# Patient Record
Sex: FEMALE | Race: WHITE | Employment: PART TIME | URBAN - METROPOLITAN AREA
[De-identification: names, ages, dates, MRNs, and addresses within clinical notes are randomized per-mention and may not be internally consistent; named-entity substitution may affect disease eponyms.]

---

## 2017-11-30 PROBLEM — I10 ESSENTIAL HYPERTENSION: Status: ACTIVE | Noted: 2017-11-30

## 2018-03-03 ENCOUNTER — PATIENT MESSAGE (OUTPATIENT)
Dept: FAMILY MEDICINE CLINIC | Age: 53
End: 2018-03-03

## 2018-03-03 DIAGNOSIS — I10 ESSENTIAL HYPERTENSION: ICD-10-CM

## 2018-03-05 NOTE — TELEPHONE ENCOUNTER
From: Claybon Runner  To: Glenna Smith MD  Sent: 3/3/2018 3:30 PM EST  Subject: Prescription Elnora Gist . We were trying hydrochlorothiazide 12.5 with the Losartan. It seems to be working well. My numbers have been 130-140s under mid 70s to low 80s. I am due for a refill. I only have 6 left. Can you refill this for me with refills. My next appt with you is in may. Thank you Javid Mckenna.

## 2018-03-06 RX ORDER — HYDROCHLOROTHIAZIDE 12.5 MG/1
12.5 TABLET ORAL DAILY
Qty: 90 TABLET | Refills: 1 | Status: SHIPPED | OUTPATIENT
Start: 2018-03-06 | End: 2018-07-29 | Stop reason: SDUPTHER

## 2018-05-31 ENCOUNTER — HOSPITAL ENCOUNTER (OUTPATIENT)
Age: 53
Discharge: HOME | End: 2018-06-02
Payer: COMMERCIAL

## 2018-05-31 ENCOUNTER — OFFICE VISIT (OUTPATIENT)
Dept: FAMILY MEDICINE CLINIC | Age: 53
End: 2018-05-31
Payer: COMMERCIAL

## 2018-05-31 VITALS
SYSTOLIC BLOOD PRESSURE: 118 MMHG | WEIGHT: 219 LBS | TEMPERATURE: 98.5 F | DIASTOLIC BLOOD PRESSURE: 70 MMHG | OXYGEN SATURATION: 98 % | BODY MASS INDEX: 36.44 KG/M2 | HEART RATE: 92 BPM

## 2018-05-31 DIAGNOSIS — E78.2 MIXED HYPERLIPIDEMIA: ICD-10-CM

## 2018-05-31 DIAGNOSIS — I10 ESSENTIAL HYPERTENSION: Primary | ICD-10-CM

## 2018-05-31 DIAGNOSIS — I10 ESSENTIAL HYPERTENSION: ICD-10-CM

## 2018-05-31 DIAGNOSIS — M79.2 RADICULAR PAIN IN RIGHT ARM: ICD-10-CM

## 2018-05-31 LAB
ALBUMIN SERPL-MCNC: 4.2 G/DL (ref 3.5–5.2)
ALP BLD-CCNC: 54 U/L (ref 35–104)
ALT SERPL-CCNC: 29 U/L (ref 0–32)
ANION GAP SERPL CALCULATED.3IONS-SCNC: 12 MMOL/L (ref 7–16)
AST SERPL-CCNC: 29 U/L (ref 0–31)
BASOPHILS ABSOLUTE: 0.04 E9/L (ref 0–0.2)
BASOPHILS RELATIVE PERCENT: 0.6 % (ref 0–2)
BILIRUB SERPL-MCNC: 0.8 MG/DL (ref 0–1.2)
BUN BLDV-MCNC: 18 MG/DL (ref 6–20)
CALCIUM SERPL-MCNC: 9.9 MG/DL (ref 8.6–10.2)
CHLORIDE BLD-SCNC: 100 MMOL/L (ref 98–107)
CHOLESTEROL, TOTAL: 183 MG/DL (ref 0–199)
CO2: 27 MMOL/L (ref 22–29)
CREAT SERPL-MCNC: 0.7 MG/DL (ref 0.5–1)
EOSINOPHILS ABSOLUTE: 0.19 E9/L (ref 0.05–0.5)
EOSINOPHILS RELATIVE PERCENT: 3.1 % (ref 0–6)
GFR AFRICAN AMERICAN: >60
GFR NON-AFRICAN AMERICAN: >60 ML/MIN/1.73
GLUCOSE BLD-MCNC: 79 MG/DL (ref 74–109)
HCT VFR BLD CALC: 41.4 % (ref 34–48)
HDLC SERPL-MCNC: 45 MG/DL
HEMOGLOBIN: 13.4 G/DL (ref 11.5–15.5)
IMMATURE GRANULOCYTES #: 0.02 E9/L
IMMATURE GRANULOCYTES %: 0.3 % (ref 0–5)
LDL CHOLESTEROL CALCULATED: 103 MG/DL (ref 0–99)
LYMPHOCYTES ABSOLUTE: 1.51 E9/L (ref 1.5–4)
LYMPHOCYTES RELATIVE PERCENT: 24.5 % (ref 20–42)
MCH RBC QN AUTO: 29.8 PG (ref 26–35)
MCHC RBC AUTO-ENTMCNC: 32.4 % (ref 32–34.5)
MCV RBC AUTO: 92 FL (ref 80–99.9)
MONOCYTES ABSOLUTE: 0.56 E9/L (ref 0.1–0.95)
MONOCYTES RELATIVE PERCENT: 9.1 % (ref 2–12)
NEUTROPHILS ABSOLUTE: 3.85 E9/L (ref 1.8–7.3)
NEUTROPHILS RELATIVE PERCENT: 62.4 % (ref 43–80)
PDW BLD-RTO: 13.4 FL (ref 11.5–15)
PLATELET # BLD: 220 E9/L (ref 130–450)
PMV BLD AUTO: 10.7 FL (ref 7–12)
POTASSIUM SERPL-SCNC: 4.2 MMOL/L (ref 3.5–5)
RBC # BLD: 4.5 E12/L (ref 3.5–5.5)
SODIUM BLD-SCNC: 139 MMOL/L (ref 132–146)
TOTAL PROTEIN: 7.3 G/DL (ref 6.4–8.3)
TRIGL SERPL-MCNC: 174 MG/DL (ref 0–149)
VLDLC SERPL CALC-MCNC: 35 MG/DL
WBC # BLD: 6.2 E9/L (ref 4.5–11.5)

## 2018-05-31 PROCEDURE — 3014F SCREEN MAMMO DOC REV: CPT | Performed by: FAMILY MEDICINE

## 2018-05-31 PROCEDURE — 80053 COMPREHEN METABOLIC PANEL: CPT

## 2018-05-31 PROCEDURE — 1036F TOBACCO NON-USER: CPT | Performed by: FAMILY MEDICINE

## 2018-05-31 PROCEDURE — 85025 COMPLETE CBC W/AUTO DIFF WBC: CPT

## 2018-05-31 PROCEDURE — 99214 OFFICE O/P EST MOD 30 MIN: CPT | Performed by: FAMILY MEDICINE

## 2018-05-31 PROCEDURE — 3017F COLORECTAL CA SCREEN DOC REV: CPT | Performed by: FAMILY MEDICINE

## 2018-05-31 PROCEDURE — G8417 CALC BMI ABV UP PARAM F/U: HCPCS | Performed by: FAMILY MEDICINE

## 2018-05-31 PROCEDURE — 36415 COLL VENOUS BLD VENIPUNCTURE: CPT | Performed by: FAMILY MEDICINE

## 2018-05-31 PROCEDURE — G8427 DOCREV CUR MEDS BY ELIG CLIN: HCPCS | Performed by: FAMILY MEDICINE

## 2018-05-31 PROCEDURE — 80061 LIPID PANEL: CPT

## 2018-05-31 RX ORDER — METHYLPREDNISOLONE 4 MG/1
TABLET ORAL
Qty: 1 KIT | Refills: 0 | Status: SHIPPED | OUTPATIENT
Start: 2018-05-31 | End: 2018-06-06

## 2018-05-31 RX ORDER — FLUTICASONE PROPIONATE 50 MCG
1 SPRAY, SUSPENSION (ML) NASAL DAILY
COMMUNITY
End: 2019-03-19 | Stop reason: ALTCHOICE

## 2018-06-01 ENCOUNTER — PATIENT MESSAGE (OUTPATIENT)
Dept: FAMILY MEDICINE CLINIC | Age: 53
End: 2018-06-01

## 2018-06-01 DIAGNOSIS — M79.2 RADICULAR PAIN IN RIGHT ARM: Primary | ICD-10-CM

## 2018-06-08 DIAGNOSIS — M79.2 RADICULAR PAIN IN RIGHT ARM: ICD-10-CM

## 2018-07-29 DIAGNOSIS — I10 ESSENTIAL HYPERTENSION: ICD-10-CM

## 2018-07-30 RX ORDER — HYDROCHLOROTHIAZIDE 12.5 MG/1
12.5 TABLET ORAL DAILY
Qty: 90 TABLET | Refills: 1 | Status: SHIPPED | OUTPATIENT
Start: 2018-07-30 | End: 2018-11-29 | Stop reason: SDUPTHER

## 2018-07-30 RX ORDER — LOSARTAN POTASSIUM AND HYDROCHLOROTHIAZIDE 25; 100 MG/1; MG/1
TABLET ORAL
Qty: 90 TABLET | Refills: 1 | Status: SHIPPED | OUTPATIENT
Start: 2018-07-30 | End: 2018-11-29 | Stop reason: SDUPTHER

## 2018-10-21 DIAGNOSIS — E78.5 HYPERLIPIDEMIA, UNSPECIFIED HYPERLIPIDEMIA TYPE: ICD-10-CM

## 2018-10-22 RX ORDER — ATORVASTATIN CALCIUM 20 MG/1
20 TABLET, FILM COATED ORAL DAILY
Qty: 90 TABLET | Refills: 1 | Status: SHIPPED | OUTPATIENT
Start: 2018-10-22 | End: 2018-11-29 | Stop reason: SDUPTHER

## 2018-11-29 ENCOUNTER — OFFICE VISIT (OUTPATIENT)
Dept: FAMILY MEDICINE CLINIC | Age: 53
End: 2018-11-29
Payer: COMMERCIAL

## 2018-11-29 VITALS
HEART RATE: 78 BPM | TEMPERATURE: 98 F | SYSTOLIC BLOOD PRESSURE: 128 MMHG | OXYGEN SATURATION: 94 % | WEIGHT: 215 LBS | DIASTOLIC BLOOD PRESSURE: 80 MMHG | BODY MASS INDEX: 35.78 KG/M2

## 2018-11-29 DIAGNOSIS — L98.9 SKIN LESION: ICD-10-CM

## 2018-11-29 DIAGNOSIS — M79.644 PAIN OF RIGHT THUMB: ICD-10-CM

## 2018-11-29 DIAGNOSIS — I10 ESSENTIAL HYPERTENSION: Primary | ICD-10-CM

## 2018-11-29 PROCEDURE — G8427 DOCREV CUR MEDS BY ELIG CLIN: HCPCS | Performed by: FAMILY MEDICINE

## 2018-11-29 PROCEDURE — 3017F COLORECTAL CA SCREEN DOC REV: CPT | Performed by: FAMILY MEDICINE

## 2018-11-29 PROCEDURE — 1036F TOBACCO NON-USER: CPT | Performed by: FAMILY MEDICINE

## 2018-11-29 PROCEDURE — G8484 FLU IMMUNIZE NO ADMIN: HCPCS | Performed by: FAMILY MEDICINE

## 2018-11-29 PROCEDURE — 99213 OFFICE O/P EST LOW 20 MIN: CPT | Performed by: FAMILY MEDICINE

## 2018-11-29 PROCEDURE — G8417 CALC BMI ABV UP PARAM F/U: HCPCS | Performed by: FAMILY MEDICINE

## 2018-11-29 PROCEDURE — 3014F SCREEN MAMMO DOC REV: CPT | Performed by: FAMILY MEDICINE

## 2018-11-29 RX ORDER — HYDROCHLOROTHIAZIDE 12.5 MG/1
12.5 TABLET ORAL DAILY
Qty: 90 TABLET | Refills: 3 | Status: SHIPPED | OUTPATIENT
Start: 2018-11-29 | End: 2019-06-27 | Stop reason: SDUPTHER

## 2018-11-29 RX ORDER — LOSARTAN POTASSIUM AND HYDROCHLOROTHIAZIDE 25; 100 MG/1; MG/1
TABLET ORAL
Qty: 90 TABLET | Refills: 3 | Status: SHIPPED | OUTPATIENT
Start: 2018-11-29 | End: 2019-06-27 | Stop reason: SDUPTHER

## 2018-11-29 RX ORDER — ATORVASTATIN CALCIUM 20 MG/1
20 TABLET, FILM COATED ORAL DAILY
Qty: 90 TABLET | Refills: 3 | Status: SHIPPED | OUTPATIENT
Start: 2018-11-29 | End: 2019-06-27 | Stop reason: SDUPTHER

## 2018-11-29 ASSESSMENT — PATIENT HEALTH QUESTIONNAIRE - PHQ9
1. LITTLE INTEREST OR PLEASURE IN DOING THINGS: 0
2. FEELING DOWN, DEPRESSED OR HOPELESS: 0
SUM OF ALL RESPONSES TO PHQ9 QUESTIONS 1 & 2: 0
SUM OF ALL RESPONSES TO PHQ QUESTIONS 1-9: 0
SUM OF ALL RESPONSES TO PHQ QUESTIONS 1-9: 0

## 2018-12-20 ENCOUNTER — OFFICE VISIT (OUTPATIENT)
Dept: FAMILY MEDICINE CLINIC | Age: 53
End: 2018-12-20
Payer: COMMERCIAL

## 2018-12-20 ENCOUNTER — HOSPITAL ENCOUNTER (OUTPATIENT)
Age: 53
Discharge: HOME | End: 2018-12-22
Payer: COMMERCIAL

## 2018-12-20 VITALS
DIASTOLIC BLOOD PRESSURE: 78 MMHG | WEIGHT: 215 LBS | HEART RATE: 80 BPM | TEMPERATURE: 98.3 F | OXYGEN SATURATION: 96 % | SYSTOLIC BLOOD PRESSURE: 138 MMHG | BODY MASS INDEX: 35.82 KG/M2 | HEIGHT: 65 IN

## 2018-12-20 DIAGNOSIS — L98.9 SKIN LESION OF LEFT ARM: Primary | ICD-10-CM

## 2018-12-20 PROCEDURE — 11100 PR BIOPSY OF SKIN LESION: CPT | Performed by: FAMILY MEDICINE

## 2018-12-20 PROCEDURE — 88305 TISSUE EXAM BY PATHOLOGIST: CPT

## 2018-12-22 RX ORDER — LIDOCAINE HYDROCHLORIDE AND EPINEPHRINE 10; 10 MG/ML; UG/ML
1 INJECTION, SOLUTION INFILTRATION; PERINEURAL ONCE
Status: SHIPPED | OUTPATIENT
Start: 2018-12-22

## 2019-01-17 ENCOUNTER — OFFICE VISIT (OUTPATIENT)
Dept: SURGERY | Age: 54
End: 2019-01-17
Payer: COMMERCIAL

## 2019-01-17 VITALS
HEIGHT: 65 IN | WEIGHT: 210 LBS | TEMPERATURE: 97.4 F | OXYGEN SATURATION: 98 % | DIASTOLIC BLOOD PRESSURE: 92 MMHG | BODY MASS INDEX: 34.99 KG/M2 | RESPIRATION RATE: 18 BRPM | HEART RATE: 78 BPM | SYSTOLIC BLOOD PRESSURE: 138 MMHG

## 2019-01-17 DIAGNOSIS — N62 MACROMASTIA: Primary | ICD-10-CM

## 2019-01-17 PROCEDURE — 99204 OFFICE O/P NEW MOD 45 MIN: CPT | Performed by: PLASTIC SURGERY

## 2019-01-17 PROCEDURE — 1036F TOBACCO NON-USER: CPT | Performed by: PLASTIC SURGERY

## 2019-01-17 PROCEDURE — G8427 DOCREV CUR MEDS BY ELIG CLIN: HCPCS | Performed by: PLASTIC SURGERY

## 2019-01-17 PROCEDURE — G8484 FLU IMMUNIZE NO ADMIN: HCPCS | Performed by: PLASTIC SURGERY

## 2019-01-17 PROCEDURE — 3014F SCREEN MAMMO DOC REV: CPT | Performed by: PLASTIC SURGERY

## 2019-01-17 PROCEDURE — G8417 CALC BMI ABV UP PARAM F/U: HCPCS | Performed by: PLASTIC SURGERY

## 2019-01-17 PROCEDURE — 3017F COLORECTAL CA SCREEN DOC REV: CPT | Performed by: PLASTIC SURGERY

## 2019-03-20 ENCOUNTER — TELEPHONE (OUTPATIENT)
Dept: FAMILY MEDICINE CLINIC | Age: 54
End: 2019-03-20

## 2019-03-22 ENCOUNTER — ANESTHESIA EVENT (OUTPATIENT)
Dept: OPERATING ROOM | Age: 54
End: 2019-03-22
Payer: COMMERCIAL

## 2019-03-25 ENCOUNTER — HOSPITAL ENCOUNTER (OUTPATIENT)
Age: 54
Setting detail: OUTPATIENT SURGERY
Discharge: HOME | End: 2019-03-25
Attending: PLASTIC SURGERY | Admitting: PLASTIC SURGERY
Payer: COMMERCIAL

## 2019-03-25 ENCOUNTER — ANESTHESIA (OUTPATIENT)
Dept: OPERATING ROOM | Age: 54
End: 2019-03-25
Payer: COMMERCIAL

## 2019-03-25 VITALS
SYSTOLIC BLOOD PRESSURE: 108 MMHG | OXYGEN SATURATION: 100 % | DIASTOLIC BLOOD PRESSURE: 46 MMHG | RESPIRATION RATE: 12 BRPM

## 2019-03-25 VITALS
HEART RATE: 89 BPM | DIASTOLIC BLOOD PRESSURE: 73 MMHG | BODY MASS INDEX: 35.65 KG/M2 | WEIGHT: 214 LBS | SYSTOLIC BLOOD PRESSURE: 136 MMHG | OXYGEN SATURATION: 96 % | TEMPERATURE: 97 F | RESPIRATION RATE: 16 BRPM | HEIGHT: 65 IN

## 2019-03-25 DIAGNOSIS — I10 ESSENTIAL HYPERTENSION: ICD-10-CM

## 2019-03-25 DIAGNOSIS — Z79.899 LONG TERM CURRENT USE OF DIURETIC: Primary | ICD-10-CM

## 2019-03-25 DIAGNOSIS — Z98.890 S/P BILATERAL BREAST REDUCTION: ICD-10-CM

## 2019-03-25 PROCEDURE — 3700000001 HC ADD 15 MINUTES (ANESTHESIA): Performed by: PLASTIC SURGERY

## 2019-03-25 PROCEDURE — 3700000000 HC ANESTHESIA ATTENDED CARE: Performed by: PLASTIC SURGERY

## 2019-03-25 PROCEDURE — 6360000002 HC RX W HCPCS: Performed by: PLASTIC SURGERY

## 2019-03-25 PROCEDURE — 6370000000 HC RX 637 (ALT 250 FOR IP): Performed by: ANESTHESIOLOGY

## 2019-03-25 PROCEDURE — 2500000003 HC RX 250 WO HCPCS: Performed by: NURSE ANESTHETIST, CERTIFIED REGISTERED

## 2019-03-25 PROCEDURE — 6360000002 HC RX W HCPCS: Performed by: PHYSICIAN ASSISTANT

## 2019-03-25 PROCEDURE — 6360000002 HC RX W HCPCS: Performed by: NURSE ANESTHETIST, CERTIFIED REGISTERED

## 2019-03-25 PROCEDURE — 7100000000 HC PACU RECOVERY - FIRST 15 MIN: Performed by: PLASTIC SURGERY

## 2019-03-25 PROCEDURE — 2709999900 HC NON-CHARGEABLE SUPPLY: Performed by: PLASTIC SURGERY

## 2019-03-25 PROCEDURE — 3600000014 HC SURGERY LEVEL 4 ADDTL 15MIN: Performed by: PLASTIC SURGERY

## 2019-03-25 PROCEDURE — 2720000010 HC SURG SUPPLY STERILE: Performed by: PLASTIC SURGERY

## 2019-03-25 PROCEDURE — 2580000003 HC RX 258: Performed by: PLASTIC SURGERY

## 2019-03-25 PROCEDURE — 2780000010 HC IMPLANT OTHER: Performed by: PLASTIC SURGERY

## 2019-03-25 PROCEDURE — 19318 BREAST REDUCTION: CPT | Performed by: PLASTIC SURGERY

## 2019-03-25 PROCEDURE — 3600000004 HC SURGERY LEVEL 4 BASE: Performed by: PLASTIC SURGERY

## 2019-03-25 PROCEDURE — 19318 BREAST REDUCTION: CPT | Performed by: PHYSICIAN ASSISTANT

## 2019-03-25 PROCEDURE — 2580000003 HC RX 258: Performed by: PHYSICIAN ASSISTANT

## 2019-03-25 PROCEDURE — 7100000011 HC PHASE II RECOVERY - ADDTL 15 MIN: Performed by: PLASTIC SURGERY

## 2019-03-25 PROCEDURE — 2500000003 HC RX 250 WO HCPCS: Performed by: PLASTIC SURGERY

## 2019-03-25 PROCEDURE — 88305 TISSUE EXAM BY PATHOLOGIST: CPT

## 2019-03-25 PROCEDURE — 2580000003 HC RX 258: Performed by: ANESTHESIOLOGY

## 2019-03-25 PROCEDURE — 7100000001 HC PACU RECOVERY - ADDTL 15 MIN: Performed by: PLASTIC SURGERY

## 2019-03-25 PROCEDURE — 7100000010 HC PHASE II RECOVERY - FIRST 15 MIN: Performed by: PLASTIC SURGERY

## 2019-03-25 RX ORDER — CEPHALEXIN 500 MG/1
500 CAPSULE ORAL 4 TIMES DAILY
Qty: 20 CAPSULE | Refills: 0 | Status: SHIPPED | OUTPATIENT
Start: 2019-03-25 | End: 2019-03-30

## 2019-03-25 RX ORDER — ROCURONIUM BROMIDE 10 MG/ML
INJECTION, SOLUTION INTRAVENOUS PRN
Status: DISCONTINUED | OUTPATIENT
Start: 2019-03-25 | End: 2019-03-25 | Stop reason: SDUPTHER

## 2019-03-25 RX ORDER — PROMETHAZINE HYDROCHLORIDE 25 MG/ML
25 INJECTION, SOLUTION INTRAMUSCULAR; INTRAVENOUS
Status: DISCONTINUED | OUTPATIENT
Start: 2019-03-25 | End: 2019-03-25 | Stop reason: HOSPADM

## 2019-03-25 RX ORDER — BUPIVACAINE HYDROCHLORIDE 2.5 MG/ML
INJECTION, SOLUTION EPIDURAL; INFILTRATION; INTRACAUDAL PRN
Status: DISCONTINUED | OUTPATIENT
Start: 2019-03-25 | End: 2019-03-25 | Stop reason: ALTCHOICE

## 2019-03-25 RX ORDER — MEPERIDINE HYDROCHLORIDE 50 MG/ML
12.5 INJECTION INTRAMUSCULAR; INTRAVENOUS; SUBCUTANEOUS EVERY 5 MIN PRN
Status: DISCONTINUED | OUTPATIENT
Start: 2019-03-25 | End: 2019-03-25 | Stop reason: HOSPADM

## 2019-03-25 RX ORDER — SODIUM CHLORIDE, SODIUM LACTATE, POTASSIUM CHLORIDE, CALCIUM CHLORIDE 600; 310; 30; 20 MG/100ML; MG/100ML; MG/100ML; MG/100ML
INJECTION, SOLUTION INTRAVENOUS CONTINUOUS
Status: DISCONTINUED | OUTPATIENT
Start: 2019-03-25 | End: 2019-03-25 | Stop reason: HOSPADM

## 2019-03-25 RX ORDER — FENTANYL CITRATE 50 UG/ML
50 INJECTION, SOLUTION INTRAMUSCULAR; INTRAVENOUS EVERY 5 MIN PRN
Status: DISCONTINUED | OUTPATIENT
Start: 2019-03-25 | End: 2019-03-25 | Stop reason: HOSPADM

## 2019-03-25 RX ORDER — DIPHENHYDRAMINE HYDROCHLORIDE 50 MG/ML
12.5 INJECTION INTRAMUSCULAR; INTRAVENOUS
Status: DISCONTINUED | OUTPATIENT
Start: 2019-03-25 | End: 2019-03-25 | Stop reason: HOSPADM

## 2019-03-25 RX ORDER — METOCLOPRAMIDE 10 MG/1
10 TABLET ORAL ONCE
Status: COMPLETED | OUTPATIENT
Start: 2019-03-25 | End: 2019-03-25

## 2019-03-25 RX ORDER — DEXAMETHASONE SODIUM PHOSPHATE 10 MG/ML
INJECTION, SOLUTION INTRAMUSCULAR; INTRAVENOUS PRN
Status: DISCONTINUED | OUTPATIENT
Start: 2019-03-25 | End: 2019-03-25 | Stop reason: SDUPTHER

## 2019-03-25 RX ORDER — SODIUM CHLORIDE, SODIUM LACTATE, POTASSIUM CHLORIDE, CALCIUM CHLORIDE 600; 310; 30; 20 MG/100ML; MG/100ML; MG/100ML; MG/100ML
INJECTION, SOLUTION INTRAVENOUS CONTINUOUS
Status: CANCELLED | OUTPATIENT
Start: 2019-03-25

## 2019-03-25 RX ORDER — PROPOFOL 10 MG/ML
INJECTION, EMULSION INTRAVENOUS PRN
Status: DISCONTINUED | OUTPATIENT
Start: 2019-03-25 | End: 2019-03-25 | Stop reason: SDUPTHER

## 2019-03-25 RX ORDER — GLYCOPYRROLATE 1 MG/5 ML
SYRINGE (ML) INTRAVENOUS PRN
Status: DISCONTINUED | OUTPATIENT
Start: 2019-03-25 | End: 2019-03-25 | Stop reason: SDUPTHER

## 2019-03-25 RX ORDER — NEOSTIGMINE METHYLSULFATE 1 MG/ML
INJECTION, SOLUTION INTRAVENOUS PRN
Status: DISCONTINUED | OUTPATIENT
Start: 2019-03-25 | End: 2019-03-25 | Stop reason: SDUPTHER

## 2019-03-25 RX ORDER — SODIUM CHLORIDE 9 MG/ML
INJECTION, SOLUTION INTRAVENOUS CONTINUOUS
Status: DISCONTINUED | OUTPATIENT
Start: 2019-03-25 | End: 2019-03-25 | Stop reason: HOSPADM

## 2019-03-25 RX ORDER — OXYCODONE HYDROCHLORIDE AND ACETAMINOPHEN 5; 325 MG/1; MG/1
2 TABLET ORAL EVERY 4 HOURS PRN
Status: DISCONTINUED | OUTPATIENT
Start: 2019-03-25 | End: 2019-03-25 | Stop reason: HOSPADM

## 2019-03-25 RX ORDER — SODIUM CHLORIDE 0.9 % (FLUSH) 0.9 %
10 SYRINGE (ML) INJECTION EVERY 12 HOURS SCHEDULED
Status: DISCONTINUED | OUTPATIENT
Start: 2019-03-25 | End: 2019-03-25 | Stop reason: HOSPADM

## 2019-03-25 RX ORDER — ONDANSETRON 2 MG/ML
INJECTION INTRAMUSCULAR; INTRAVENOUS PRN
Status: DISCONTINUED | OUTPATIENT
Start: 2019-03-25 | End: 2019-03-25 | Stop reason: SDUPTHER

## 2019-03-25 RX ORDER — HYDRALAZINE HYDROCHLORIDE 20 MG/ML
5 INJECTION INTRAMUSCULAR; INTRAVENOUS EVERY 10 MIN PRN
Status: DISCONTINUED | OUTPATIENT
Start: 2019-03-25 | End: 2019-03-25 | Stop reason: HOSPADM

## 2019-03-25 RX ORDER — HYDROMORPHONE HYDROCHLORIDE 1 MG/ML
0.25 INJECTION, SOLUTION INTRAMUSCULAR; INTRAVENOUS; SUBCUTANEOUS EVERY 5 MIN PRN
Status: DISCONTINUED | OUTPATIENT
Start: 2019-03-25 | End: 2019-03-25 | Stop reason: HOSPADM

## 2019-03-25 RX ORDER — OXYCODONE HYDROCHLORIDE AND ACETAMINOPHEN 5; 325 MG/1; MG/1
1 TABLET ORAL EVERY 6 HOURS PRN
Qty: 25 TABLET | Refills: 0 | Status: SHIPPED | OUTPATIENT
Start: 2019-03-25 | End: 2019-04-01

## 2019-03-25 RX ORDER — MORPHINE SULFATE 2 MG/ML
1 INJECTION, SOLUTION INTRAMUSCULAR; INTRAVENOUS EVERY 5 MIN PRN
Status: DISCONTINUED | OUTPATIENT
Start: 2019-03-25 | End: 2019-03-25 | Stop reason: HOSPADM

## 2019-03-25 RX ORDER — FAMOTIDINE 20 MG/1
20 TABLET, FILM COATED ORAL ONCE
Status: COMPLETED | OUTPATIENT
Start: 2019-03-25 | End: 2019-03-25

## 2019-03-25 RX ORDER — LABETALOL HYDROCHLORIDE 5 MG/ML
5 INJECTION, SOLUTION INTRAVENOUS EVERY 10 MIN PRN
Status: DISCONTINUED | OUTPATIENT
Start: 2019-03-25 | End: 2019-03-25 | Stop reason: HOSPADM

## 2019-03-25 RX ORDER — SODIUM CHLORIDE 0.9 % (FLUSH) 0.9 %
10 SYRINGE (ML) INJECTION PRN
Status: DISCONTINUED | OUTPATIENT
Start: 2019-03-25 | End: 2019-03-25 | Stop reason: HOSPADM

## 2019-03-25 RX ORDER — FENTANYL CITRATE 50 UG/ML
INJECTION, SOLUTION INTRAMUSCULAR; INTRAVENOUS PRN
Status: DISCONTINUED | OUTPATIENT
Start: 2019-03-25 | End: 2019-03-25 | Stop reason: SDUPTHER

## 2019-03-25 RX ORDER — MEPERIDINE HYDROCHLORIDE 50 MG/ML
INJECTION INTRAMUSCULAR; INTRAVENOUS; SUBCUTANEOUS PRN
Status: DISCONTINUED | OUTPATIENT
Start: 2019-03-25 | End: 2019-03-25 | Stop reason: SDUPTHER

## 2019-03-25 RX ORDER — MIDAZOLAM HYDROCHLORIDE 1 MG/ML
INJECTION INTRAMUSCULAR; INTRAVENOUS PRN
Status: DISCONTINUED | OUTPATIENT
Start: 2019-03-25 | End: 2019-03-25 | Stop reason: SDUPTHER

## 2019-03-25 RX ADMIN — Medication 3 MG: at 14:45

## 2019-03-25 RX ADMIN — Medication 40 MG: at 13:02

## 2019-03-25 RX ADMIN — FENTANYL CITRATE 50 MCG: 50 INJECTION, SOLUTION INTRAMUSCULAR; INTRAVENOUS at 13:10

## 2019-03-25 RX ADMIN — PROPOFOL 200 MG: 10 INJECTION, EMULSION INTRAVENOUS at 13:02

## 2019-03-25 RX ADMIN — FENTANYL CITRATE 100 MCG: 50 INJECTION, SOLUTION INTRAMUSCULAR; INTRAVENOUS at 13:45

## 2019-03-25 RX ADMIN — ONDANSETRON HYDROCHLORIDE 4 MG: 2 INJECTION, SOLUTION INTRAMUSCULAR; INTRAVENOUS at 14:36

## 2019-03-25 RX ADMIN — FAMOTIDINE 20 MG: 20 TABLET ORAL at 11:20

## 2019-03-25 RX ADMIN — MIDAZOLAM 2 MG: 1 INJECTION INTRAMUSCULAR; INTRAVENOUS at 12:58

## 2019-03-25 RX ADMIN — CEFAZOLIN 2 G: 1 INJECTION, POWDER, FOR SOLUTION INTRAMUSCULAR; INTRAVENOUS at 12:55

## 2019-03-25 RX ADMIN — DEXAMETHASONE SODIUM PHOSPHATE 10 MG: 10 INJECTION, SOLUTION INTRAMUSCULAR; INTRAVENOUS at 13:09

## 2019-03-25 RX ADMIN — SODIUM CHLORIDE, POTASSIUM CHLORIDE, SODIUM LACTATE AND CALCIUM CHLORIDE: 600; 310; 30; 20 INJECTION, SOLUTION INTRAVENOUS at 11:43

## 2019-03-25 RX ADMIN — Medication 0.6 MG: at 14:45

## 2019-03-25 RX ADMIN — LIDOCAINE HYDROCHLORIDE 50 MG: 20 INJECTION, SOLUTION INTRAVENOUS at 13:02

## 2019-03-25 RX ADMIN — MEPERIDINE HYDROCHLORIDE 25 MG: 50 INJECTION, SOLUTION INTRAMUSCULAR; INTRAVENOUS; SUBCUTANEOUS at 15:00

## 2019-03-25 RX ADMIN — OXYCODONE AND ACETAMINOPHEN 1 TABLET: 5; 325 TABLET ORAL at 16:00

## 2019-03-25 RX ADMIN — FENTANYL CITRATE 100 MCG: 50 INJECTION, SOLUTION INTRAMUSCULAR; INTRAVENOUS at 13:20

## 2019-03-25 RX ADMIN — METOCLOPRAMIDE 10 MG: 10 TABLET ORAL at 11:20

## 2019-03-25 ASSESSMENT — PULMONARY FUNCTION TESTS
PIF_VALUE: 18
PIF_VALUE: 19
PIF_VALUE: 20
PIF_VALUE: 19
PIF_VALUE: 19
PIF_VALUE: 15
PIF_VALUE: 19
PIF_VALUE: 20
PIF_VALUE: 16
PIF_VALUE: 19
PIF_VALUE: 2
PIF_VALUE: 19
PIF_VALUE: 20
PIF_VALUE: 21
PIF_VALUE: 19
PIF_VALUE: 18
PIF_VALUE: 15
PIF_VALUE: 15
PIF_VALUE: 19
PIF_VALUE: 19
PIF_VALUE: 16
PIF_VALUE: 19
PIF_VALUE: 4
PIF_VALUE: 19
PIF_VALUE: 16
PIF_VALUE: 19
PIF_VALUE: 16
PIF_VALUE: 20
PIF_VALUE: 19
PIF_VALUE: 5
PIF_VALUE: 19
PIF_VALUE: 18
PIF_VALUE: 19
PIF_VALUE: 18
PIF_VALUE: 21
PIF_VALUE: 19
PIF_VALUE: 20
PIF_VALUE: 19
PIF_VALUE: 16
PIF_VALUE: 21
PIF_VALUE: 20
PIF_VALUE: 17
PIF_VALUE: 19
PIF_VALUE: 18
PIF_VALUE: 20
PIF_VALUE: 19
PIF_VALUE: 15
PIF_VALUE: 19
PIF_VALUE: 21
PIF_VALUE: 20
PIF_VALUE: 15
PIF_VALUE: 14
PIF_VALUE: 19
PIF_VALUE: 20
PIF_VALUE: 19
PIF_VALUE: 20
PIF_VALUE: 19
PIF_VALUE: 19
PIF_VALUE: 26
PIF_VALUE: 20
PIF_VALUE: 18
PIF_VALUE: 18
PIF_VALUE: 19
PIF_VALUE: 20
PIF_VALUE: 19
PIF_VALUE: 1
PIF_VALUE: 17
PIF_VALUE: 19
PIF_VALUE: 20
PIF_VALUE: 19
PIF_VALUE: 1
PIF_VALUE: 18
PIF_VALUE: 18
PIF_VALUE: 22
PIF_VALUE: 18
PIF_VALUE: 19
PIF_VALUE: 18
PIF_VALUE: 21
PIF_VALUE: 20
PIF_VALUE: 5
PIF_VALUE: 19
PIF_VALUE: 20
PIF_VALUE: 0
PIF_VALUE: 18
PIF_VALUE: 19
PIF_VALUE: 5
PIF_VALUE: 24
PIF_VALUE: 5
PIF_VALUE: 19
PIF_VALUE: 19
PIF_VALUE: 29
PIF_VALUE: 19
PIF_VALUE: 15
PIF_VALUE: 5
PIF_VALUE: 0
PIF_VALUE: 19
PIF_VALUE: 18
PIF_VALUE: 5
PIF_VALUE: 17
PIF_VALUE: 16
PIF_VALUE: 20
PIF_VALUE: 19
PIF_VALUE: 20
PIF_VALUE: 18
PIF_VALUE: 19
PIF_VALUE: 16
PIF_VALUE: 5
PIF_VALUE: 2
PIF_VALUE: 9
PIF_VALUE: 20
PIF_VALUE: 19
PIF_VALUE: 18
PIF_VALUE: 16
PIF_VALUE: 1
PIF_VALUE: 20
PIF_VALUE: 19

## 2019-03-25 ASSESSMENT — PAIN DESCRIPTION - PAIN TYPE
TYPE: SURGICAL PAIN

## 2019-03-25 ASSESSMENT — PAIN SCALES - GENERAL
PAINLEVEL_OUTOF10: 0
PAINLEVEL_OUTOF10: 5
PAINLEVEL_OUTOF10: 0
PAINLEVEL_OUTOF10: 3

## 2019-03-25 ASSESSMENT — PAIN DESCRIPTION - PROGRESSION: CLINICAL_PROGRESSION: GRADUALLY IMPROVING

## 2019-03-25 ASSESSMENT — PAIN - FUNCTIONAL ASSESSMENT: PAIN_FUNCTIONAL_ASSESSMENT: 0-10

## 2019-03-28 ENCOUNTER — TELEPHONE (OUTPATIENT)
Dept: SURGERY | Age: 54
End: 2019-03-28

## 2019-03-29 ENCOUNTER — TELEPHONE (OUTPATIENT)
Dept: FAMILY MEDICINE CLINIC | Age: 54
End: 2019-03-29

## 2019-04-01 ENCOUNTER — OFFICE VISIT (OUTPATIENT)
Dept: SURGERY | Age: 54
End: 2019-04-01

## 2019-04-01 VITALS — HEIGHT: 65 IN | RESPIRATION RATE: 18 BRPM | BODY MASS INDEX: 34.99 KG/M2 | WEIGHT: 210 LBS

## 2019-04-01 DIAGNOSIS — N62 MACROMASTIA: Primary | ICD-10-CM

## 2019-04-01 PROCEDURE — 99024 POSTOP FOLLOW-UP VISIT: CPT | Performed by: PHYSICIAN ASSISTANT

## 2019-04-01 NOTE — PROGRESS NOTES
Subjective: Follow up today from bilateral breast reduction. Denies fever, nausea, vomiting, leg pain or swelling, pain is mild to moderate. She states she is taking her ABX as directed as well as analgesia PRN. She voices no complaints at this time other than pruritus to her bilateral breasts. Objective: There were no vitals taken for this visit. Left Breast Wound: Clean, dry, and intact, no drainage. Steri strips intact NAC with capillary refill intact. Appropriate level of edema and ecchymosis noted. flaps viable with good capillary refill at the area of trifurcation. Right Breast Wound: Clean, dry, and intact, no drainage. Steri strips intact NAC with capillary refill intact. Appropriate level of edema and ecchymosis noted. flaps viable with good capillary refill at the area of trifurcation. Ecchymosis noted to right breast inframammary fold and vertical component incision line decreased capillary refill 3 cm distally to the trifurcation of the right medial breast reduction flap.       Assessment:    Patient Active Problem List   Diagnosis    Absence of menstruation    Essential hypertension    Mixed hyperlipidemia    Long term current use of diuretic       [address], [address] [facility], [address] [place] [address]              FINAL SURGICAL PATHOLOGY REPORT    NAME:            MARGE JOHNSON          Date of       03/25/2019                                           Collection:  Medical Record   [id number]              Date of       03/27/2019  Number:                                  Receipt:  Age:  46 Y        Sex:  F                Date          03/29/2019 11:44                                           Reported:  Date Of Birth:   1965  Financial        [id number]             Admitting     YAKELIN JON  Number:                                  Physician:  Patient          ANTONIA   HORSUYAPA        Ordering      YAKELIN JON  Location:                                Physician:    Accession Number:  [id number]-            Diagnosis:  A.  Left breast, reduction: Involutional changes of breast, unremarkable  overlying skin. B.  Right breast, reduction: Involutional changes of breast, unremarkable  overlying skin. Plan:     Continue with analgesia PRN   ABX until completed  Surgical Bra at all times with padding PRN for comfort  No driving while taking narcotic pain medication or while UE ROM is limited. OK to begin B/L UE ROM exercises  OK to shower at this time. Advised the patient that they can allow soap and water to rinse of the incision site while showering. Once they are done in the shower they are to pat dry the incision site with a clean paper towel. No baths, hot tubs or soaking of the wound site at this time. Pt voices understanding.   -I informed the patient that she may develop some wound into the right breast trifurcation as there is decreased capillary refill to the left medial breast reduction flap. Patient and  voice understanding  Steri-Strips are removed today educated the patient placed gauze pads over the incision lines when necessary saturation    F/U in 1 week. Call office with concerns or signs of infection.     Rajesh Shi

## 2019-04-02 ENCOUNTER — TELEPHONE (OUTPATIENT)
Dept: SURGERY | Age: 54
End: 2019-04-02

## 2019-04-02 NOTE — TELEPHONE ENCOUNTER
Patient called in area where steri strips were removed, there is itchiness, new redness, and warm, patient took temperature 97.8/ please advise.

## 2019-04-03 ENCOUNTER — OFFICE VISIT (OUTPATIENT)
Dept: SURGERY | Age: 54
End: 2019-04-03

## 2019-04-03 VITALS
RESPIRATION RATE: 20 BRPM | HEIGHT: 65 IN | SYSTOLIC BLOOD PRESSURE: 138 MMHG | BODY MASS INDEX: 34.99 KG/M2 | TEMPERATURE: 97.5 F | DIASTOLIC BLOOD PRESSURE: 90 MMHG | WEIGHT: 210 LBS | OXYGEN SATURATION: 98 % | HEART RATE: 98 BPM

## 2019-04-03 DIAGNOSIS — N62 MACROMASTIA: Primary | ICD-10-CM

## 2019-04-03 PROCEDURE — 99024 POSTOP FOLLOW-UP VISIT: CPT | Performed by: PHYSICIAN ASSISTANT

## 2019-04-03 RX ORDER — CLINDAMYCIN HYDROCHLORIDE 300 MG/1
300 CAPSULE ORAL 3 TIMES DAILY
Qty: 15 CAPSULE | Refills: 0 | Status: SHIPPED | OUTPATIENT
Start: 2019-04-03 | End: 2019-04-10

## 2019-04-03 NOTE — PROGRESS NOTES
[address]  [facility]                                                     Simran HERNANDEZ[place], [address], [address] [place] [address]              FINAL SURGICAL PATHOLOGY REPORT    NAME:            MARGE JOHNSON          Date of       03/25/2019                                           Collection:  Medical Record   [id number]              Date of       03/27/2019  Number:                                  Receipt:  Age:  46 Y        Sex:  F                Date          03/29/2019 11:44                                           Reported:  Date Of Birth:   1965  Financial        [id number]             Admitting     YAKELIN JON  Number:                                  Physician:  Patient          ANTONIA MILLER        Ordering      YAKELIN JON  Location:                                Physician:    Accession Number:  [id number]-            Diagnosis:  A.  Left breast, reduction: Involutional changes of breast, unremarkable  overlying skin. B.  Right breast, reduction: Involutional changes of breast, unremarkable  overlying skin. Plan:     Status post bilateral breast reduction, bilateral breast erythema circumareolar    Continue with analgesia PRN   Surgical Bra at all times with padding PRN for comfort  No driving while taking narcotic pain medication or while UE ROM is limited. OK to begin B/L UE ROM exercises  OK to shower at this time. Advised the patient that they can allow soap and water to rinse of the incision site while showering. Once they are done in the shower they are to pat dry the incision site with a clean paper towel. No baths, hot tubs or soaking of the wound site at this time.   Pt voices understanding.   -I informed the patient that she may develop some wound into the right breast trifurcation as there is decreased capillary refill to the left medial breast reduction flap. Patient and  voice understanding  Steri-Strips are removed today educated the patient placed gauze pads over the incision lines when necessary saturation    I informed the patient we will rule out any underlying infection as well as treat her pruritus to bilateral breasts. Informed patient to use any new soaps lotions or ointments over the breast.    Areas of erythema to bilateral breast demarcated today with purple skin marking pen I informed the patient to contact our office should this area of erythema exceed past this line of demarcation. Patient voices understanding    Clindamycin ordered for the patient today. Hydrocortisone 2.5% ordered today  F/U in 5 days    Call office with concerns or signs of infection.     Aravind Castro

## 2019-04-05 ENCOUNTER — TELEPHONE (OUTPATIENT)
Dept: SURGERY | Age: 54
End: 2019-04-05

## 2019-04-05 NOTE — TELEPHONE ENCOUNTER
Patient only received 15 tablets the directions were for Cipro po one tid for 7 days     The additional 6 tablets were called in today to the pharmacy

## 2019-04-05 NOTE — TELEPHONE ENCOUNTER
Per  7 days for the Cipro called pharmacy they will have the medication ready for patient to pickup patient was notified.

## 2019-04-08 NOTE — PROGRESS NOTES
Subjective: Follow up today from bilateral breast reduction. Denies fever, nausea, vomiting, leg pain or swelling, pain is absent. She states the pruritus to bilateral breast has dissipated since her previous follow-up as well as her erythema to bilateral breasts. She states she still having some serosanguineous drainage from her bilateral breast inframammary folds. She states her pain is better however she is still having difficulty with full range of motion to her bilateral upper extremities from the pool and strain on her inframammary fold incisions. Objective:    Vitals:    04/10/19 1531   BP: 122/80   Pulse: 77   Temp: 97.2 °F (36.2 °C)   SpO2: 98%         Left Breast Wound: Clean, dry, and intact, no drainage. Flaps viable good capillary refill to breast reduction flaps no signs of infection      Right Breast Wound: Clean, dry, and intact, no drainage.  Flaps viable good capillary refill to breast reduction flaps no signs of infection      Assessment:    Patient Active Problem List   Diagnosis    Absence of menstruation    Essential hypertension    Mixed hyperlipidemia    Long term current use of diuretic       [address]                                                   [facility] [address], [address], [address], [address] [place] [address]              FINAL SURGICAL PATHOLOGY REPORT    NAME:            MARGE JOHNSON          Date of       03/25/2019                                           Collection:  Medical Record   [id number]              Date of       03/27/2019  Number:                                  Receipt:  Age:  46 Y        Sex:  F                Date          03/29/2019 11:44

## 2019-04-10 ENCOUNTER — OFFICE VISIT (OUTPATIENT)
Dept: SURGERY | Age: 54
End: 2019-04-10

## 2019-04-10 VITALS
HEART RATE: 77 BPM | OXYGEN SATURATION: 98 % | BODY MASS INDEX: 34.16 KG/M2 | DIASTOLIC BLOOD PRESSURE: 80 MMHG | HEIGHT: 65 IN | SYSTOLIC BLOOD PRESSURE: 122 MMHG | TEMPERATURE: 97.2 F | WEIGHT: 205 LBS

## 2019-04-10 DIAGNOSIS — N62 MACROMASTIA: Primary | ICD-10-CM

## 2019-04-10 PROCEDURE — 99024 POSTOP FOLLOW-UP VISIT: CPT | Performed by: PHYSICIAN ASSISTANT

## 2019-04-17 ENCOUNTER — OFFICE VISIT (OUTPATIENT)
Dept: SURGERY | Age: 54
End: 2019-04-17

## 2019-04-17 DIAGNOSIS — N62 MACROMASTIA: Primary | ICD-10-CM

## 2019-04-17 PROCEDURE — 99024 POSTOP FOLLOW-UP VISIT: CPT | Performed by: PHYSICIAN ASSISTANT

## 2019-04-17 NOTE — PROGRESS NOTES
Reported:  Date Of Birth:   1965  Financial        [id number]             Admitting     YAKELIN JON  Number:                                  Physician:  Patient          TALITADEVENX   HORSUYAPA        Ordering      YAKELIN JON  Location:                                Physician:    Accession Number:  [id number]-            Diagnosis:  A.  Left breast, reduction: Involutional changes of breast, unremarkable  overlying skin. B.  Right breast, reduction: Involutional changes of breast, unremarkable  overlying skin. Plan:     Status post bilateral breast reduction    Okay to transition to sports bra  OK to shower at this time. Advised the patient that they can allow soap and water to rinse of the incision site while showering. Once they are done in the shower they are to pat dry the incision site with a clean paper towel. No baths, hot tubs or soaking of the wound site at this time. Pt voices understanding.   -I informed the patient that she may develop some wound into the right breast trifurcation as there is decreased capillary refill to the left medial breast reduction flap. Patient and  voice understanding  -OK to return to work, no restrictions. F/U in 14 days    Call office with concerns or signs of infection.     Laura Ramirez

## 2019-04-17 NOTE — LETTER
April 17, 2019     Patient: Rachel Quiroga   YOB: 1965   Date of Visit: 4/17/2019       To Whom It May Concern: It is my medical opinion that Kike Salazar may return to work with no restrictions on Thursday, 4/18/2019. If you have any questions or concerns, please don't hesitate to call.     Sincerely,        Moriah Pichardo MD

## 2019-05-09 ENCOUNTER — OFFICE VISIT (OUTPATIENT)
Dept: SURGERY | Age: 54
End: 2019-05-09

## 2019-05-09 VITALS
HEART RATE: 72 BPM | DIASTOLIC BLOOD PRESSURE: 90 MMHG | BODY MASS INDEX: 34.16 KG/M2 | HEIGHT: 65 IN | WEIGHT: 205 LBS | TEMPERATURE: 97.6 F | SYSTOLIC BLOOD PRESSURE: 132 MMHG | OXYGEN SATURATION: 98 %

## 2019-05-09 DIAGNOSIS — N62 MACROMASTIA: Primary | ICD-10-CM

## 2019-05-09 PROCEDURE — 99024 POSTOP FOLLOW-UP VISIT: CPT | Performed by: PHYSICIAN ASSISTANT

## 2019-05-09 NOTE — PROGRESS NOTES
Subjective: Follow up today from bilateral breast reduction. Denies fever, nausea, vomiting, leg pain or swelling, pain is absent. She presents today for routine wound examination. She states that she is still having some drainage from the right breast trifurcation incision line and she states this is not completely healed. She also admits to having some scabbing of the bilateral breast incision lines. She states she has been placing Neosporin over these areas and covered with a Telfa gauze. Objective:    Vitals:    05/09/19 1032   BP: (!) 132/90   Pulse: 72   Temp: 97.6 °F (36.4 °C)   SpO2: 98%         Left Breast Wound: Clean, dry, and intact, no drainage. Flaps viable good capillary refill to breast reduction flaps no signs of infection      Right Breast Wound: Clean, dry, and intact, no drainage. Flaps viable good capillary refill to breast reduction flaps no signs of infection area of trifurcation with superficial epidermal lysis 2 cm x 2 cm no tracking or undermining no signs of infection.  Subcutaneous palpable fullness in line with the patient's right breast reduction pedicle consistent with fat necrosis approximately 4 cm x 4 cm at the 5:00 position      Assessment:    Patient Active Problem List   Diagnosis    Absence of menstruation    Essential hypertension    Mixed hyperlipidemia    Long term current use of diuretic       [address]  [place], [address], [address], [address] [place], [place] [id number]              FINAL SURGICAL PATHOLOGY REPORT    NAME:            MARGE JOHNSON          Date of       03/25/2019                                           Collection:  Medical Record   [id number]              Date of       03/27/2019  Number:                                  Receipt:  Age:  46 Y        Sex:  F                Date          03/29/2019 11:44                                           Reported:  Date Of Birth:   1965  [id number]             Admitting     YAKELIN JON  Number:                                  Physician:  Patient          TALITADEVENX   HORSURJITNON        Ordering      YAKELIN JON  Location:                                Physician:    Accession Number:  [id number]-            Diagnosis:  A.  Left breast, reduction: Involutional changes of breast, unremarkable  overlying skin. B.  Right breast, reduction: Involutional changes of breast, unremarkable  overlying skin. Plan:     Status post bilateral breast reduction    I informed the patient to transition away from Neosporin and use Vaseline only to the right breast trifurcation wound site as well as areas of scabbing. Patient voices understanding    I informed the patient that her palpable fullness to the right breast is likely tissue necrosis versus scar maturity this will take several months to completely resolve if it does. Patient voices understanding    For the patient her wounds may take several more weeks to fully heal however there is no indication for surgical intervention. Patient voices understanding    OK to shower at this time no soaking of the wound site. F/U in 6 weeks    Call office with concerns or signs of infection.     Rajesh Shi

## 2019-06-27 ENCOUNTER — HOSPITAL ENCOUNTER (OUTPATIENT)
Age: 54
Discharge: HOME | End: 2019-06-29
Payer: COMMERCIAL

## 2019-06-27 ENCOUNTER — OFFICE VISIT (OUTPATIENT)
Dept: FAMILY MEDICINE CLINIC | Age: 54
End: 2019-06-27
Payer: COMMERCIAL

## 2019-06-27 VITALS
TEMPERATURE: 98.4 F | HEART RATE: 116 BPM | HEIGHT: 65 IN | DIASTOLIC BLOOD PRESSURE: 70 MMHG | BODY MASS INDEX: 36.32 KG/M2 | SYSTOLIC BLOOD PRESSURE: 124 MMHG | OXYGEN SATURATION: 97 % | WEIGHT: 218 LBS

## 2019-06-27 DIAGNOSIS — M76.821 POSTERIOR TIBIAL TENDON DYSFUNCTION, RIGHT: ICD-10-CM

## 2019-06-27 DIAGNOSIS — E78.2 MIXED HYPERLIPIDEMIA: ICD-10-CM

## 2019-06-27 DIAGNOSIS — I10 ESSENTIAL HYPERTENSION: ICD-10-CM

## 2019-06-27 DIAGNOSIS — M15.9 PRIMARY OSTEOARTHRITIS INVOLVING MULTIPLE JOINTS: ICD-10-CM

## 2019-06-27 DIAGNOSIS — H02.9 EYELID LESION: Primary | ICD-10-CM

## 2019-06-27 PROCEDURE — 90750 HZV VACC RECOMBINANT IM: CPT | Performed by: FAMILY MEDICINE

## 2019-06-27 PROCEDURE — 3014F SCREEN MAMMO DOC REV: CPT | Performed by: FAMILY MEDICINE

## 2019-06-27 PROCEDURE — 99214 OFFICE O/P EST MOD 30 MIN: CPT | Performed by: FAMILY MEDICINE

## 2019-06-27 PROCEDURE — 1036F TOBACCO NON-USER: CPT | Performed by: FAMILY MEDICINE

## 2019-06-27 PROCEDURE — 80053 COMPREHEN METABOLIC PANEL: CPT

## 2019-06-27 PROCEDURE — 90471 IMMUNIZATION ADMIN: CPT | Performed by: FAMILY MEDICINE

## 2019-06-27 PROCEDURE — G8417 CALC BMI ABV UP PARAM F/U: HCPCS | Performed by: FAMILY MEDICINE

## 2019-06-27 PROCEDURE — 36415 COLL VENOUS BLD VENIPUNCTURE: CPT | Performed by: FAMILY MEDICINE

## 2019-06-27 PROCEDURE — 80061 LIPID PANEL: CPT

## 2019-06-27 PROCEDURE — 3017F COLORECTAL CA SCREEN DOC REV: CPT | Performed by: FAMILY MEDICINE

## 2019-06-27 PROCEDURE — G8427 DOCREV CUR MEDS BY ELIG CLIN: HCPCS | Performed by: FAMILY MEDICINE

## 2019-06-27 RX ORDER — ATORVASTATIN CALCIUM 20 MG/1
20 TABLET, FILM COATED ORAL DAILY
Qty: 90 TABLET | Refills: 3 | Status: SHIPPED | OUTPATIENT
Start: 2019-06-27 | End: 2019-08-29 | Stop reason: SDUPTHER

## 2019-06-27 RX ORDER — HYDROCHLOROTHIAZIDE 12.5 MG/1
12.5 TABLET ORAL DAILY
Qty: 90 TABLET | Refills: 3 | Status: SHIPPED | OUTPATIENT
Start: 2019-06-27 | End: 2019-12-26

## 2019-06-27 RX ORDER — LOSARTAN POTASSIUM AND HYDROCHLOROTHIAZIDE 25; 100 MG/1; MG/1
TABLET ORAL
Qty: 90 TABLET | Refills: 3 | Status: SHIPPED | OUTPATIENT
Start: 2019-06-27 | End: 2019-08-29 | Stop reason: SDUPTHER

## 2019-06-27 ASSESSMENT — PATIENT HEALTH QUESTIONNAIRE - PHQ9
SUM OF ALL RESPONSES TO PHQ9 QUESTIONS 1 & 2: 0
SUM OF ALL RESPONSES TO PHQ QUESTIONS 1-9: 0
SUM OF ALL RESPONSES TO PHQ QUESTIONS 1-9: 0
2. FEELING DOWN, DEPRESSED OR HOPELESS: 0
1. LITTLE INTEREST OR PLEASURE IN DOING THINGS: 0

## 2019-06-27 NOTE — PROGRESS NOTES
[facility]  Office Progress Note - Dr. Zane Parekh  6/27/19    CC:   Chief Complaint   Patient presents with    Hypertension        S: Refill prescription for a year  Scary mild  Shingles vaccine    Lesion on eye, might want referral to Derm  - 2 months ago  - not itchy  - red, scaly   - has tried lotions and oils with minimal relief  - actinic keratosis on back   - avoids sun, few sun burns as child  - wears sunscreen    Osteoarthritis  She is requesting Generic celebrex: insurance states generic version will need preauth   She is requesting this because she feels OA is worsening in hands, left knee pain and BL ankle pain (current fitted for braces)  She took celebrex years ago and it worked very well. Meloxicam has not worked for her. She had tried ibu and aleve without much improvement. Has also used tylenol mixed with ibu without much relief. Ankle swelling: poster tibial tendon dysfunction dx by podiatry, is being fitted for a brace     Hyperlipidemia  Follow-up  Patient continues lipid-lowering medication. Lipitor, 20 mg. Osteoarthritis compliance is good. No side effects noted. No myalgias. Lab Results   Component Value Date    CHOL 211 (H) 06/27/2019    CHOL 183 05/31/2018    CHOL 199 11/30/2017     Lab Results   Component Value Date    TRIG 108 06/27/2019    TRIG 174 (H) 05/31/2018    TRIG 161 (H) 11/30/2017     Lab Results   Component Value Date    HDL 57 06/27/2019    HDL 45 05/31/2018    HDL 51 11/30/2017     Lab Results   Component Value Date    LDLCALC 132 (H) 06/27/2019    LDLCALC 103 (H) 05/31/2018    LDLCALC 116 (H) 11/30/2017     Lipids are fairly adequately controlled. The 10-year ASCVD risk score (Kylah Martinez, et al., 2013) is: 2.6%    Values used to calculate the score:      Age: 53 years      Sex: Female      Is Non- : No      Diabetic: No      Tobacco smoker: No      Systolic Blood Pressure: 434 mmHg      Is BP treated:  Yes HDL Cholesterol: 57 mg/dL      Total Cholesterol: 211 mg/dL    Hypertension  Follow-up  Blood pressure is  controlled today. BP Readings from Last 3 Encounters:   06/27/19 124/70   05/09/19 (!) 132/90   04/10/19 122/80   Added an additional hydrochlorothiazide tablet to her Hyzaar. Patient continues medications regularly. Compliance is good. Denies CP, sob, abd pain, headaches, vision changes, dizziness, hypotensive symptoms. No side effects from medications noted. Past Medical History:   Diagnosis Date    Hyperlipidemia     Hypertension     Prolonged emergence from general anesthesia        Family History   Problem Relation Age of Onset    Hypertension Father     Heart Attack Father     Stroke Mother     Hypertension Mother     Cancer Brother         renal    Kidney Cancer Brother     Thyroid Disease Sister     Diabetes Brother     Thyroid Disease Brother        Past Surgical History:   Procedure Laterality Date    BREAST REDUCTION SURGERY Bilateral 03/25/2019    BREAST REDUCTION SURGERY Bilateral 3/25/2019    BILATERAL BREAST REDUCTION performed [facility]  2005    FOOT SURGERY      x2    LAPAROSCOPY  2007    right ovarian cyst    LAPAROSCOPY  2000    right tubal preg       Social History     Tobacco Use    Smoking status: Never Smoker    Smokeless tobacco: Never Used   Substance Use Topics    Alcohol use: No    Drug use: No       ROS:  No CP, No palpitations,   No sob, No cough,   No abd pain, No heartburn,   No headaches,   No tingling, No numbness, No weakness,   No bowel changes, No hematochezia, No melena,  No bladder changes, No hematuria  No skin rashes, +skin lesions. No vision changes, No hearing changes,   No polyuria, polydipsia, polyphagia. Stable mood. ROS otherwise negative unless as listed in HPI. Chart reviewed and updated where appropriate for PMH, Fam, and Soc Hx.     Physical Exam   /70 (Site: Right Upper Arm, Position: Sitting, Cuff Size: Large Adult)   Pulse 116   Temp 98.4 °F (36.9 °C) (Oral)   Ht 5' 5\" (1.651 m)   Wt 218 lb (98.9 kg)   SpO2 97%   BMI 36.28 kg/m²   Wt Readings from Last 3 Encounters:   06/27/19 218 lb (98.9 kg)   05/09/19 205 lb (93 kg)   04/10/19 205 lb (93 kg)       Constitutional:    She is oriented to person, place, and time. She appears well-developed and well-nourished. HENT:    Nose: Nose normal.    Mouth/Throat: Oropharynx is clear and moist.   Eyes:    Conjunctivae are normal.    Pupils are equal, round, and reactive to light. EOMI. small scaly lesion on and eyelid, left side. Possible AK  Neck:    Normal range of motion. No thyromegaly or nodules noted. No bruit. Cardiovascular:    Normal rate, regular rhythm and normal heart sounds. No murmur. No gallop and no friction rub. Pulmonary/Chest:    Effort normal and breath sounds normal.    No wheezes. No rales or rhonchi. Abdominal:    Soft. Bowel sounds are normal.    No distension. No tenderness. Musculoskeletal:    Normal range of motion. No joint swelling noted. No peripheral edema. Skin:    Skin is warm and dry. No rashes, lesions. Psychiatric:    She has a normal mood and affect. Normal groom and dress. Current Outpatient Medications on File Prior to Visit   Medication Sig Dispense Refill    hydrocortisone 2.5 % cream Apply topically 2 times daily.  3 Tube 5    Multiple Vitamins-Minerals (DAILY MULTIVITAMIN PO) Take 1 tablet by mouth daily       Current Facility-Administered Medications on File Prior to Visit   Medication Dose Route Frequency Provider Last Rate Last Dose    lidocaine-EPINEPHrine 1 percent-1:[id number] injection 1 mL  1 mL Intradermal Once Joao Gee MD           Patient Active Problem List   Diagnosis Code    Absence of menstruation N91.2    Essential hypertension I10    Mixed hyperlipidemia E78.2    Long term current use of diuretic Z79.899    Posterior tibial tendon dysfunction, right M76.821        Assessment / Carrie Nunez was seen today for hypertension. Diagnoses and all orders for this visit:    Eyelid lesion  Referred to dermatology    Posterior tibial tendon dysfunction, right  Continue to follow with podiatry. Essential hypertension, now controlled, stable  -   Continue losartan-hydrochlorothiazide (HYZAAR) 100-25 MG per tablet; TAKE 1 TABLET BY MOUTH  DAILY FOR BLOOD PRESSURE  -Continue   hydrochlorothiazide (HYDRODIURIL) 12.5 MG tablet; Take 1 tablet by mouth daily For blood pressure. -     Comprehensive Metabolic Panel; Future    Mixed hyperlipidemia, stable  -     Lipid Panel; Future  Lipids reviewed. Continue atorvastatin. Primary osteoarthritis involving multiple joints  -Start   celecoxib (CELEBREX) 100 MG capsule; Take 1 capsule by mouth 2 times daily  Failed multiple other NSAIDs as in the HPI and Celebrex did help historically. Other orders  -     atorvastatin (LIPITOR) 20 MG tablet; Take 1 tablet by mouth daily For cholesterol.  -     Zoster recombinant [facility])    Follow-up in 6 months or so. Patient counseled to follow up sooner or seek more acute care if symptoms worsening. Electronically signed by Miguel Angel Zarco MD on 6/30/2019    This note may have been created using dictation software.  Efforts were made to reduce grammatical or syntax errors, but some may persist.

## 2019-06-28 LAB
ALBUMIN SERPL-MCNC: 4.6 G/DL (ref 3.5–5.2)
ALP BLD-CCNC: 68 U/L (ref 35–104)
ALT SERPL-CCNC: 52 U/L (ref 0–32)
ANION GAP SERPL CALCULATED.3IONS-SCNC: 17 MMOL/L (ref 7–16)
AST SERPL-CCNC: 31 U/L (ref 0–31)
BILIRUB SERPL-MCNC: 0.4 MG/DL (ref 0–1.2)
BUN BLDV-MCNC: 25 MG/DL (ref 6–20)
CALCIUM SERPL-MCNC: 10.5 MG/DL (ref 8.6–10.2)
CHLORIDE BLD-SCNC: 102 MMOL/L (ref 98–107)
CHOLESTEROL, TOTAL: 211 MG/DL (ref 0–199)
CO2: 22 MMOL/L (ref 22–29)
CREAT SERPL-MCNC: 0.6 MG/DL (ref 0.5–1)
GFR AFRICAN AMERICAN: >60
GFR NON-AFRICAN AMERICAN: >60 ML/MIN/1.73
GLUCOSE BLD-MCNC: 140 MG/DL (ref 74–99)
HDLC SERPL-MCNC: 57 MG/DL
LDL CHOLESTEROL CALCULATED: 132 MG/DL (ref 0–99)
POTASSIUM SERPL-SCNC: 4.3 MMOL/L (ref 3.5–5)
SODIUM BLD-SCNC: 141 MMOL/L (ref 132–146)
TOTAL PROTEIN: 7.7 G/DL (ref 6.4–8.3)
TRIGL SERPL-MCNC: 108 MG/DL (ref 0–149)
VLDLC SERPL CALC-MCNC: 22 MG/DL

## 2019-06-30 RX ORDER — CELECOXIB 100 MG/1
100 CAPSULE ORAL 2 TIMES DAILY
Qty: 60 CAPSULE | Refills: 0 | Status: SHIPPED | OUTPATIENT
Start: 2019-06-30 | End: 2019-07-18 | Stop reason: SDUPTHER

## 2019-07-01 NOTE — PROGRESS NOTES
Pt will contact Dr. Yamil Vargas office to schedule an appt. If she needs a referral she will give us a call.

## 2019-07-02 DIAGNOSIS — E83.52 HYPERCALCEMIA: Primary | ICD-10-CM

## 2019-07-02 DIAGNOSIS — R74.01 ELEVATED ALT MEASUREMENT: ICD-10-CM

## 2019-07-08 ENCOUNTER — TELEPHONE (OUTPATIENT)
Dept: FAMILY MEDICINE CLINIC | Age: 54
End: 2019-07-08

## 2019-07-08 DIAGNOSIS — E83.52 HYPERCALCEMIA: Primary | ICD-10-CM

## 2019-07-18 RX ORDER — CELECOXIB 100 MG/1
100 CAPSULE ORAL 2 TIMES DAILY
Qty: 60 CAPSULE | Refills: 5 | Status: SHIPPED
Start: 2019-07-18 | End: 2020-08-28 | Stop reason: SDUPTHER

## 2019-08-06 ENCOUNTER — HOSPITAL ENCOUNTER (OUTPATIENT)
Age: 54
Discharge: HOME | End: 2019-08-06
Payer: COMMERCIAL

## 2019-08-06 DIAGNOSIS — R74.01 ELEVATED ALT MEASUREMENT: ICD-10-CM

## 2019-08-06 DIAGNOSIS — E83.52 HYPERCALCEMIA: ICD-10-CM

## 2019-08-06 LAB
ALBUMIN SERPL-MCNC: 4.5 G/DL (ref 3.5–5.2)
ALP BLD-CCNC: 76 U/L (ref 35–104)
ALT SERPL-CCNC: 52 U/L (ref 0–32)
ANION GAP SERPL CALCULATED.3IONS-SCNC: 11 MMOL/L (ref 7–16)
AST SERPL-CCNC: 38 U/L (ref 0–31)
BILIRUB SERPL-MCNC: 0.5 MG/DL (ref 0–1.2)
BUN BLDV-MCNC: 16 MG/DL (ref 6–20)
CALCIUM SERPL-MCNC: 10.1 MG/DL (ref 8.6–10.2)
CHLORIDE BLD-SCNC: 102 MMOL/L (ref 98–107)
CO2: 29 MMOL/L (ref 22–29)
CREAT SERPL-MCNC: 0.7 MG/DL (ref 0.5–1)
GFR AFRICAN AMERICAN: >60
GFR NON-AFRICAN AMERICAN: >60 ML/MIN/1.73
GLUCOSE BLD-MCNC: 112 MG/DL (ref 74–99)
PARATHYROID HORMONE INTACT: 34 PG/ML (ref 15–65)
POTASSIUM SERPL-SCNC: 3.5 MMOL/L (ref 3.5–5)
SODIUM BLD-SCNC: 142 MMOL/L (ref 132–146)
TOTAL PROTEIN: 7.5 G/DL (ref 6.4–8.3)

## 2019-08-06 PROCEDURE — 83970 ASSAY OF PARATHORMONE: CPT

## 2019-08-06 PROCEDURE — 80053 COMPREHEN METABOLIC PANEL: CPT

## 2019-08-06 PROCEDURE — 36415 COLL VENOUS BLD VENIPUNCTURE: CPT

## 2019-08-09 ENCOUNTER — PATIENT MESSAGE (OUTPATIENT)
Dept: FAMILY MEDICINE CLINIC | Age: 54
End: 2019-08-09

## 2019-08-09 DIAGNOSIS — R74.8 ELEVATED LIVER ENZYMES: Primary | ICD-10-CM

## 2019-08-26 DIAGNOSIS — R74.8 ELEVATED LIVER ENZYMES: ICD-10-CM

## 2019-08-29 ENCOUNTER — NURSE ONLY (OUTPATIENT)
Dept: FAMILY MEDICINE CLINIC | Age: 54
End: 2019-08-29
Payer: COMMERCIAL

## 2019-08-29 DIAGNOSIS — I10 ESSENTIAL HYPERTENSION: ICD-10-CM

## 2019-08-29 DIAGNOSIS — Z23 NEED FOR VACCINATION FOR ZOSTER: Primary | ICD-10-CM

## 2019-08-29 PROCEDURE — 90471 IMMUNIZATION ADMIN: CPT | Performed by: FAMILY MEDICINE

## 2019-08-29 PROCEDURE — 90750 HZV VACC RECOMBINANT IM: CPT | Performed by: FAMILY MEDICINE

## 2019-08-29 RX ORDER — ATORVASTATIN CALCIUM 20 MG/1
20 TABLET, FILM COATED ORAL DAILY
Qty: 90 TABLET | Refills: 3 | Status: SHIPPED
Start: 2019-08-29 | End: 2020-09-04 | Stop reason: SDUPTHER

## 2019-08-29 RX ORDER — LOSARTAN POTASSIUM AND HYDROCHLOROTHIAZIDE 25; 100 MG/1; MG/1
TABLET ORAL
Qty: 90 TABLET | Refills: 3 | Status: SHIPPED
Start: 2019-08-29 | End: 2020-09-04 | Stop reason: SDUPTHER

## 2019-09-03 ENCOUNTER — TELEPHONE (OUTPATIENT)
Dept: SURGERY | Age: 54
End: 2019-09-03

## 2019-11-30 ENCOUNTER — PATIENT MESSAGE (OUTPATIENT)
Dept: FAMILY MEDICINE CLINIC | Age: 54
End: 2019-11-30

## 2019-11-30 DIAGNOSIS — Z00.00 ENCOUNTER FOR WELL ADULT EXAM WITHOUT ABNORMAL FINDINGS: Primary | ICD-10-CM

## 2019-12-19 ENCOUNTER — HOSPITAL ENCOUNTER (OUTPATIENT)
Age: 54
Discharge: HOME | End: 2019-12-19
Payer: COMMERCIAL

## 2019-12-19 DIAGNOSIS — Z00.00 ENCOUNTER FOR WELL ADULT EXAM WITHOUT ABNORMAL FINDINGS: ICD-10-CM

## 2019-12-19 LAB
ALBUMIN SERPL-MCNC: 4.6 G/DL (ref 3.5–5.2)
ALP BLD-CCNC: 70 U/L (ref 35–104)
ALT SERPL-CCNC: 38 U/L (ref 0–32)
ANION GAP SERPL CALCULATED.3IONS-SCNC: 10 MMOL/L (ref 7–16)
AST SERPL-CCNC: 29 U/L (ref 0–31)
BASOPHILS ABSOLUTE: 0.03 E9/L (ref 0–0.2)
BASOPHILS RELATIVE PERCENT: 0.6 % (ref 0–2)
BILIRUB SERPL-MCNC: 0.9 MG/DL (ref 0–1.2)
BUN BLDV-MCNC: 23 MG/DL (ref 6–20)
CALCIUM SERPL-MCNC: 10.1 MG/DL (ref 8.6–10.2)
CHLORIDE BLD-SCNC: 102 MMOL/L (ref 98–107)
CHOLESTEROL, TOTAL: 167 MG/DL (ref 0–199)
CO2: 29 MMOL/L (ref 22–29)
CREAT SERPL-MCNC: 0.8 MG/DL (ref 0.5–1)
EOSINOPHILS ABSOLUTE: 0.17 E9/L (ref 0.05–0.5)
EOSINOPHILS RELATIVE PERCENT: 3.2 % (ref 0–6)
GFR AFRICAN AMERICAN: >60
GFR NON-AFRICAN AMERICAN: >60 ML/MIN/1.73
GLUCOSE BLD-MCNC: 95 MG/DL (ref 74–99)
HCT VFR BLD CALC: 43.3 % (ref 34–48)
HDLC SERPL-MCNC: 46 MG/DL
HEMOGLOBIN: 14.2 G/DL (ref 11.5–15.5)
IMMATURE GRANULOCYTES #: 0.01 E9/L
IMMATURE GRANULOCYTES %: 0.2 % (ref 0–5)
LDL CHOLESTEROL CALCULATED: 99 MG/DL (ref 0–99)
LYMPHOCYTES ABSOLUTE: 1.68 E9/L (ref 1.5–4)
LYMPHOCYTES RELATIVE PERCENT: 31.8 % (ref 20–42)
MCH RBC QN AUTO: 29.5 PG (ref 26–35)
MCHC RBC AUTO-ENTMCNC: 32.8 % (ref 32–34.5)
MCV RBC AUTO: 89.8 FL (ref 80–99.9)
MONOCYTES ABSOLUTE: 0.49 E9/L (ref 0.1–0.95)
MONOCYTES RELATIVE PERCENT: 9.3 % (ref 2–12)
NEUTROPHILS ABSOLUTE: 2.9 E9/L (ref 1.8–7.3)
NEUTROPHILS RELATIVE PERCENT: 54.9 % (ref 43–80)
PDW BLD-RTO: 13 FL (ref 11.5–15)
PLATELET # BLD: 249 E9/L (ref 130–450)
PMV BLD AUTO: 10.3 FL (ref 7–12)
POTASSIUM SERPL-SCNC: 4 MMOL/L (ref 3.5–5)
RBC # BLD: 4.82 E12/L (ref 3.5–5.5)
SODIUM BLD-SCNC: 141 MMOL/L (ref 132–146)
TOTAL PROTEIN: 7.5 G/DL (ref 6.4–8.3)
TRIGL SERPL-MCNC: 112 MG/DL (ref 0–149)
VLDLC SERPL CALC-MCNC: 22 MG/DL
WBC # BLD: 5.3 E9/L (ref 4.5–11.5)

## 2019-12-19 PROCEDURE — 80061 LIPID PANEL: CPT

## 2019-12-19 PROCEDURE — 36415 COLL VENOUS BLD VENIPUNCTURE: CPT

## 2019-12-19 PROCEDURE — 85025 COMPLETE CBC W/AUTO DIFF WBC: CPT

## 2019-12-19 PROCEDURE — 80053 COMPREHEN METABOLIC PANEL: CPT

## 2019-12-26 ENCOUNTER — OFFICE VISIT (OUTPATIENT)
Dept: FAMILY MEDICINE CLINIC | Age: 54
End: 2019-12-26
Payer: COMMERCIAL

## 2019-12-26 VITALS
HEIGHT: 65 IN | TEMPERATURE: 96.6 F | SYSTOLIC BLOOD PRESSURE: 118 MMHG | WEIGHT: 196 LBS | BODY MASS INDEX: 32.65 KG/M2 | HEART RATE: 102 BPM | DIASTOLIC BLOOD PRESSURE: 60 MMHG | OXYGEN SATURATION: 94 %

## 2019-12-26 DIAGNOSIS — K76.0 FATTY LIVER: ICD-10-CM

## 2019-12-26 DIAGNOSIS — E78.2 MIXED HYPERLIPIDEMIA: ICD-10-CM

## 2019-12-26 DIAGNOSIS — I10 ESSENTIAL HYPERTENSION: Primary | ICD-10-CM

## 2019-12-26 PROCEDURE — 99213 OFFICE O/P EST LOW 20 MIN: CPT | Performed by: FAMILY MEDICINE

## 2019-12-26 PROCEDURE — G9899 SCRN MAM PERF RSLTS DOC: HCPCS | Performed by: FAMILY MEDICINE

## 2019-12-26 PROCEDURE — G8484 FLU IMMUNIZE NO ADMIN: HCPCS | Performed by: FAMILY MEDICINE

## 2019-12-26 PROCEDURE — G8427 DOCREV CUR MEDS BY ELIG CLIN: HCPCS | Performed by: FAMILY MEDICINE

## 2019-12-26 PROCEDURE — 1036F TOBACCO NON-USER: CPT | Performed by: FAMILY MEDICINE

## 2019-12-26 PROCEDURE — G8417 CALC BMI ABV UP PARAM F/U: HCPCS | Performed by: FAMILY MEDICINE

## 2019-12-26 PROCEDURE — 3017F COLORECTAL CA SCREEN DOC REV: CPT | Performed by: FAMILY MEDICINE

## 2020-05-08 ENCOUNTER — PATIENT MESSAGE (OUTPATIENT)
Dept: FAMILY MEDICINE CLINIC | Age: 55
End: 2020-05-08

## 2020-08-07 ENCOUNTER — PATIENT MESSAGE (OUTPATIENT)
Dept: FAMILY MEDICINE CLINIC | Age: 55
End: 2020-08-07

## 2020-08-07 NOTE — TELEPHONE ENCOUNTER
From: Tyler Dent  To: Abran Barnes MD  Sent: 8/7/2020 10:28 AM EDT  Subject: Non-Urgent Ashlie Andersen. I have a office visit with you in 2 weeks and I would like my usual lipid panel and whatever else you usually order to review at that appointment. Could you put the lab order in so I can get that done ahead of time?    Thank you   Aston Tellez

## 2020-08-11 RX ORDER — LOSARTAN POTASSIUM 100 MG/1
TABLET ORAL
Qty: 30 TABLET | Refills: 5 | Status: SHIPPED
Start: 2020-08-11 | End: 2020-08-28 | Stop reason: ALTCHOICE

## 2020-08-14 ENCOUNTER — HOSPITAL ENCOUNTER (OUTPATIENT)
Age: 55
Discharge: HOME | End: 2020-08-14
Payer: COMMERCIAL

## 2020-08-14 LAB
ALBUMIN SERPL-MCNC: 4.7 G/DL (ref 3.5–5.2)
ALP BLD-CCNC: 74 U/L (ref 35–104)
ALT SERPL-CCNC: 32 U/L (ref 0–32)
ANION GAP SERPL CALCULATED.3IONS-SCNC: 10 MMOL/L (ref 7–16)
AST SERPL-CCNC: 26 U/L (ref 0–31)
BILIRUB SERPL-MCNC: 0.9 MG/DL (ref 0–1.2)
BUN BLDV-MCNC: 17 MG/DL (ref 6–20)
CALCIUM SERPL-MCNC: 10.1 MG/DL (ref 8.6–10.2)
CHLORIDE BLD-SCNC: 103 MMOL/L (ref 98–107)
CO2: 27 MMOL/L (ref 22–29)
CREAT SERPL-MCNC: 0.8 MG/DL (ref 0.5–1)
GFR AFRICAN AMERICAN: >60
GFR NON-AFRICAN AMERICAN: >60 ML/MIN/1.73
GLUCOSE BLD-MCNC: 96 MG/DL (ref 74–99)
POTASSIUM SERPL-SCNC: 4.1 MMOL/L (ref 3.5–5)
SODIUM BLD-SCNC: 140 MMOL/L (ref 132–146)
TOTAL PROTEIN: 7.5 G/DL (ref 6.4–8.3)

## 2020-08-14 PROCEDURE — 36415 COLL VENOUS BLD VENIPUNCTURE: CPT

## 2020-08-14 PROCEDURE — 80053 COMPREHEN METABOLIC PANEL: CPT

## 2020-08-14 PROCEDURE — 80061 LIPID PANEL: CPT

## 2020-08-14 NOTE — TELEPHONE ENCOUNTER
Pt called in still asking for lipid panel to be placed. She stated she made a lot of changes and wants to see if they helped.  She had lab draw enough blood for both  Please advise    Please fax order to [phone]

## 2020-08-15 LAB
CHOLESTEROL, TOTAL: 177 MG/DL (ref 0–199)
HDLC SERPL-MCNC: 51 MG/DL
LDL CHOLESTEROL CALCULATED: 105 MG/DL (ref 0–99)
TRIGL SERPL-MCNC: 107 MG/DL (ref 0–149)
VLDLC SERPL CALC-MCNC: 21 MG/DL

## 2020-08-28 ENCOUNTER — OFFICE VISIT (OUTPATIENT)
Dept: FAMILY MEDICINE CLINIC | Age: 55
End: 2020-08-28
Payer: COMMERCIAL

## 2020-08-28 VITALS
HEIGHT: 65 IN | SYSTOLIC BLOOD PRESSURE: 132 MMHG | OXYGEN SATURATION: 97 % | TEMPERATURE: 97.7 F | DIASTOLIC BLOOD PRESSURE: 82 MMHG | HEART RATE: 58 BPM | WEIGHT: 189.8 LBS | BODY MASS INDEX: 31.62 KG/M2

## 2020-08-28 PROCEDURE — G8417 CALC BMI ABV UP PARAM F/U: HCPCS | Performed by: FAMILY MEDICINE

## 2020-08-28 PROCEDURE — G9899 SCRN MAM PERF RSLTS DOC: HCPCS | Performed by: FAMILY MEDICINE

## 2020-08-28 PROCEDURE — 3017F COLORECTAL CA SCREEN DOC REV: CPT | Performed by: FAMILY MEDICINE

## 2020-08-28 PROCEDURE — 99213 OFFICE O/P EST LOW 20 MIN: CPT | Performed by: FAMILY MEDICINE

## 2020-08-28 PROCEDURE — 1036F TOBACCO NON-USER: CPT | Performed by: FAMILY MEDICINE

## 2020-08-28 PROCEDURE — G8427 DOCREV CUR MEDS BY ELIG CLIN: HCPCS | Performed by: FAMILY MEDICINE

## 2020-08-28 RX ORDER — CELECOXIB 100 MG/1
100 CAPSULE ORAL 2 TIMES DAILY
Qty: 60 CAPSULE | Refills: 5 | Status: SHIPPED
Start: 2020-08-28 | End: 2020-12-15

## 2020-08-28 ASSESSMENT — PATIENT HEALTH QUESTIONNAIRE - PHQ9
SUM OF ALL RESPONSES TO PHQ QUESTIONS 1-9: 0
SUM OF ALL RESPONSES TO PHQ QUESTIONS 1-9: 0
1. LITTLE INTEREST OR PLEASURE IN DOING THINGS: 0
SUM OF ALL RESPONSES TO PHQ9 QUESTIONS 1 & 2: 0
2. FEELING DOWN, DEPRESSED OR HOPELESS: 0

## 2020-08-28 NOTE — PROGRESS NOTES
 BREAST REDUCTION SURGERY Bilateral 3/25/2019    BILATERAL BREAST REDUCTION performed by Shannan Lugo MD at [facility]  2005    FOOT SURGERY      x2    LAPAROSCOPY  2007    right ovarian cyst    LAPAROSCOPY  2000    right tubal preg       Social History     Tobacco Use    Smoking status: Never Smoker    Smokeless tobacco: Never Used   Substance Use Topics    Alcohol use: No    Drug use: No     _________________________________________________________  ROS: POSITIVE:  Otherwise:  No CP, No palpitations,   No sob, No cough,   No abd pain, No heartburn,   No headaches, No vision changes, No hearing changes,   No tingling, No numbness, No weakness,   No bowel changes, No hematochezia, No melena,  No bladder changes, No hematuria  No skin rashes, No skin lesions. No polyuria, polydipsia, polyphagia. Stable mood. ROS otherwise negative unless as listed in HPI. Chart reviewed and updated where appropriate for PMH, Fam, and Soc Hx.  __________________________________________________________  Physical Exam   /82   Pulse 58   Temp 97.7 °F (36.5 °C)   Ht 5' 5\" (1.651 m)   Wt 189 lb 12.8 oz (86.1 kg)   SpO2 97%   BMI 31.58 kg/m²   Wt Readings from Last 3 Encounters:   08/28/20 189 lb 12.8 oz (86.1 kg)   12/26/19 196 lb (88.9 kg)   06/27/19 218 lb (98.9 kg)       Constitutional:    She is oriented to person, place, and time. She appears well-developed and well-nourished. HENT:    Nose: Nose normal.    Mouth/Throat: Oropharynx is clear and moist.   Eyes:    Conjunctivae are normal.    Pupils are equal, round, and reactive to light. EOMI. Neck:    Normal range of motion. No thyromegaly or nodules noted. No bruit. Cardiovascular:    Normal rate, regular rhythm and normal heart sounds. No murmur. No gallop and no friction rub. Pulmonary/Chest:    Effort normal and breath sounds normal.    No wheezes. No rales or rhonchi. Abdominal:    Soft.  Bowel sounds are normal.    No distension. No tenderness. Musculoskeletal:    Normal range of motion. No joint swelling noted. No peripheral edema. Skin:    Skin is warm and dry. No rashes, No lesions. Psychiatric:    She has a normal mood and affect. Normal groom and dress. No SI or HI.   ________________________________________________________  Current Outpatient Medications on File Prior to Visit   Medication Sig Dispense Refill    atorvastatin (LIPITOR) 20 MG tablet Take 1 tablet by mouth daily For cholesterol. 90 tablet 3    losartan-hydrochlorothiazide (HYZAAR) 100-25 MG per tablet TAKE 1 TABLET BY MOUTH  DAILY FOR BLOOD PRESSURE 90 tablet 3    Multiple Vitamins-Minerals (DAILY MULTIVITAMIN PO) Take 1 tablet by mouth daily       Current Facility-Administered Medications on File Prior to Visit   Medication Dose Route Frequency Provider Last Rate Last Dose    lidocaine-EPINEPHrine 1 percent-1:215116 injection 1 mL  1 mL Intradermal Once Yuriy Becker MD           Patient Active Problem List   Diagnosis Code    Absence of menstruation N91.2    Essential hypertension I10    Mixed hyperlipidemia E78.2    Long term current use of diuretic Z79.899    Posterior tibial tendon dysfunction, right M76.821      ________________________________________________________  Assessment / Vickii Glassing was seen today for hypertension and hyperlipidemia. Diagnoses and all orders for this visit:    Elevated liver enzymes  Resolved with weight loss. Hyperlipidemia, unspecified hyperlipidemia type  Lipids reviewed and adequately controlled. Continue statin. Essential hypertension  Blood pressure well controlled on current regimen. Continue same. Other orders  -     celecoxib (CELEBREX) 100 MG capsule; Take 1 capsule by mouth 2 times daily      6 months to 1 year or as scheduled.    Patient counseled to follow up sooner or seek more acute care if symptoms worsening or not improving according to plan.     Electronically signed by Magnus Pulido MD on 8/28/2020    This note may have been created using dictation software.  Efforts were made to reduce grammatical or syntax errors, but some may persist.

## 2020-09-04 RX ORDER — LOSARTAN POTASSIUM AND HYDROCHLOROTHIAZIDE 25; 100 MG/1; MG/1
TABLET ORAL
Qty: 90 TABLET | Refills: 3 | Status: SHIPPED
Start: 2020-09-04 | End: 2021-03-01

## 2020-09-04 RX ORDER — ATORVASTATIN CALCIUM 20 MG/1
20 TABLET, FILM COATED ORAL DAILY
Qty: 90 TABLET | Refills: 3 | Status: SHIPPED
Start: 2020-09-04 | End: 2021-03-01

## 2020-12-15 ENCOUNTER — APPOINTMENT (OUTPATIENT)
Dept: CT IMAGING | Age: 55
End: 2020-12-15
Payer: COMMERCIAL

## 2020-12-15 ENCOUNTER — HOSPITAL ENCOUNTER (EMERGENCY)
Age: 55
Discharge: HOME | End: 2020-12-15
Attending: EMERGENCY MEDICINE
Payer: COMMERCIAL

## 2020-12-15 ENCOUNTER — APPOINTMENT (OUTPATIENT)
Dept: GENERAL RADIOLOGY | Age: 55
End: 2020-12-15
Payer: COMMERCIAL

## 2020-12-15 VITALS
BODY MASS INDEX: 31.16 KG/M2 | OXYGEN SATURATION: 100 % | WEIGHT: 187 LBS | DIASTOLIC BLOOD PRESSURE: 66 MMHG | HEIGHT: 65 IN | HEART RATE: 66 BPM | TEMPERATURE: 98.6 F | SYSTOLIC BLOOD PRESSURE: 118 MMHG | RESPIRATION RATE: 16 BRPM

## 2020-12-15 LAB
ALBUMIN SERPL-MCNC: 4.4 G/DL (ref 3.5–5.2)
ALP BLD-CCNC: 72 U/L (ref 35–104)
ALT SERPL-CCNC: 31 U/L (ref 0–32)
ANION GAP SERPL CALCULATED.3IONS-SCNC: 10 MMOL/L (ref 7–16)
AST SERPL-CCNC: 26 U/L (ref 0–31)
BASOPHILS ABSOLUTE: 0.02 E9/L (ref 0–0.2)
BASOPHILS RELATIVE PERCENT: 0.3 % (ref 0–2)
BILIRUB SERPL-MCNC: 0.5 MG/DL (ref 0–1.2)
BUN BLDV-MCNC: 19 MG/DL (ref 6–20)
CALCIUM SERPL-MCNC: 10.5 MG/DL (ref 8.6–10.2)
CHLORIDE BLD-SCNC: 100 MMOL/L (ref 98–107)
CO2: 28 MMOL/L (ref 22–29)
CREAT SERPL-MCNC: 0.7 MG/DL (ref 0.5–1)
D DIMER: 223 NG/ML DDU
EOSINOPHILS ABSOLUTE: 0.13 E9/L (ref 0.05–0.5)
EOSINOPHILS RELATIVE PERCENT: 1.9 % (ref 0–6)
GFR AFRICAN AMERICAN: >60
GFR NON-AFRICAN AMERICAN: >60 ML/MIN/1.73
GLUCOSE BLD-MCNC: 108 MG/DL (ref 74–99)
HCT VFR BLD CALC: 42.3 % (ref 34–48)
HEMOGLOBIN: 14.5 G/DL (ref 11.5–15.5)
IMMATURE GRANULOCYTES #: 0.01 E9/L
IMMATURE GRANULOCYTES %: 0.1 % (ref 0–5)
LYMPHOCYTES ABSOLUTE: 1.82 E9/L (ref 1.5–4)
LYMPHOCYTES RELATIVE PERCENT: 26.3 % (ref 20–42)
MCH RBC QN AUTO: 30.6 PG (ref 26–35)
MCHC RBC AUTO-ENTMCNC: 34.3 % (ref 32–34.5)
MCV RBC AUTO: 89.2 FL (ref 80–99.9)
MONOCYTES ABSOLUTE: 0.52 E9/L (ref 0.1–0.95)
MONOCYTES RELATIVE PERCENT: 7.5 % (ref 2–12)
NEUTROPHILS ABSOLUTE: 4.41 E9/L (ref 1.8–7.3)
NEUTROPHILS RELATIVE PERCENT: 63.9 % (ref 43–80)
PDW BLD-RTO: 12.8 FL (ref 11.5–15)
PLATELET # BLD: 240 E9/L (ref 130–450)
PMV BLD AUTO: 10.4 FL (ref 7–12)
POTASSIUM SERPL-SCNC: 3.7 MMOL/L (ref 3.5–5)
RBC # BLD: 4.74 E12/L (ref 3.5–5.5)
SODIUM BLD-SCNC: 138 MMOL/L (ref 132–146)
TOTAL PROTEIN: 7.3 G/DL (ref 6.4–8.3)
TROPONIN: <0.01 NG/ML (ref 0–0.03)
WBC # BLD: 6.9 E9/L (ref 4.5–11.5)

## 2020-12-15 PROCEDURE — 93005 ELECTROCARDIOGRAM TRACING: CPT | Performed by: EMERGENCY MEDICINE

## 2020-12-15 PROCEDURE — 71275 CT ANGIOGRAPHY CHEST: CPT

## 2020-12-15 PROCEDURE — 6360000004 HC RX CONTRAST MEDICATION: Performed by: RADIOLOGY

## 2020-12-15 PROCEDURE — 36415 COLL VENOUS BLD VENIPUNCTURE: CPT

## 2020-12-15 PROCEDURE — 85025 COMPLETE CBC W/AUTO DIFF WBC: CPT

## 2020-12-15 PROCEDURE — 80053 COMPREHEN METABOLIC PANEL: CPT

## 2020-12-15 PROCEDURE — 84484 ASSAY OF TROPONIN QUANT: CPT

## 2020-12-15 PROCEDURE — 71045 X-RAY EXAM CHEST 1 VIEW: CPT

## 2020-12-15 PROCEDURE — 99284 EMERGENCY DEPT VISIT MOD MDM: CPT

## 2020-12-15 PROCEDURE — 85378 FIBRIN DEGRADE SEMIQUANT: CPT

## 2020-12-15 RX ADMIN — IOPAMIDOL 75 ML: 755 INJECTION, SOLUTION INTRAVENOUS at 18:51

## 2020-12-15 ASSESSMENT — PAIN DESCRIPTION - LOCATION: LOCATION: CHEST

## 2020-12-15 ASSESSMENT — PAIN SCALES - GENERAL: PAINLEVEL_OUTOF10: 2

## 2020-12-15 ASSESSMENT — PAIN DESCRIPTION - PAIN TYPE: TYPE: ACUTE PAIN

## 2020-12-15 ASSESSMENT — PAIN DESCRIPTION - ORIENTATION: ORIENTATION: MID

## 2020-12-15 ASSESSMENT — PAIN DESCRIPTION - DESCRIPTORS: DESCRIPTORS: SHARP

## 2020-12-15 NOTE — ED NOTES
C/o mid sternal chest pain that is sharp and intermittent.   Pain increases with palpation, deep breaths and movement     Darian Guzman RN  12/15/20 5126

## 2020-12-15 NOTE — ED PROVIDER NOTES
HPI:  12/15/20, Time: 5:23 PM ABDIFATAH Benítez is a 63 y.o. female presenting to the ED for sharp, substernal chest pain, beginning around 10am while at work today. Works as an xray tech. It continued all day, it got worse every time she puts the x-ray machine. This has never happened to her before. She denies shortness of breath, swelling in her legs, chest pressure, nausea or vomiting, diaphoresis, recent travel, recent surgeries or estrogen or hormonal replacement. Denies trauma to her chest wall. She declines anything for pain at this time. The complaint has been persistent, moderate in severity, and worsened by nothing. Patient denies fever/chills, sore throat, cough, congestion, shortness of breath, edema, headache, visual disturbances, focal paresthesias, focal weakness, abdominal pain, nausea, vomiting, diarrhea, constipation, dysuria, hematuria, trauma, neck or back pain, rash or other complaints. ROS:   A complete review of systems was performed and all pertinent positives and negatives are stated within HPI, all other systems reviewed and are negative.      --------------------------------------------- PAST HISTORY ---------------------------------------------  Past Medical History:  has a past medical history of Hyperlipidemia, Hypertension, and Prolonged emergence from general anesthesia. Past Surgical History:  has a past surgical history that includes Endometrial ablation (2005); laparoscopy (2007); laparoscopy (2000); Foot surgery; Breast reduction surgery (Bilateral, 03/25/2019); and Breast reduction surgery (Bilateral, 3/25/2019). Social History:  reports that she has never smoked. She has never used smokeless tobacco. She reports that she does not drink alcohol or use drugs.     Family History: family history includes Cancer in her brother; Diabetes in her brother; Heart Attack in her father; Hypertension in her father and mother; Kidney Cancer in her brother; Stroke 26.3 20.0 - 42.0 %    Monocytes % 7.5 2.0 - 12.0 %    Eosinophils % 1.9 0.0 - 6.0 %    Basophils % 0.3 0.0 - 2.0 %    Neutrophils Absolute 4.41 1.80 - 7.30 E9/L    Immature Granulocytes # 0.01 E9/L    Lymphocytes Absolute 1.82 1.50 - 4.00 E9/L    Monocytes Absolute 0.52 0.10 - 0.95 E9/L    Eosinophils Absolute 0.13 0.05 - 0.50 E9/L    Basophils Absolute 0.02 0.00 - 0.20 E9/L   Comprehensive Metabolic Panel   Result Value Ref Range    Sodium 138 132 - 146 mmol/L    Potassium 3.7 3.5 - 5.0 mmol/L    Chloride 100 98 - 107 mmol/L    CO2 28 22 - 29 mmol/L    Anion Gap 10 7 - 16 mmol/L    Glucose 108 (H) 74 - 99 mg/dL    BUN 19 6 - 20 mg/dL    CREATININE 0.7 0.5 - 1.0 mg/dL    GFR Non-African American >60 >=60 mL/min/1.73    GFR African American >60     Calcium 10.5 (H) 8.6 - 10.2 mg/dL    Total Protein 7.3 6.4 - 8.3 g/dL    Alb 4.4 3.5 - 5.2 g/dL    Total Bilirubin 0.5 0.0 - 1.2 mg/dL    Alkaline Phosphatase 72 35 - 104 U/L    ALT 31 0 - 32 U/L    AST 26 0 - 31 U/L   Troponin   Result Value Ref Range    Troponin <0.01 0.00 - 0.03 ng/mL   D-Dimer, Quantitative   Result Value Ref Range    D-Dimer, Quant 223 ng/mL DDU       RADIOLOGY:  Interpreted by Radiologist.  CTA PULMONARY W CONTRAST   Final Result   1. No pulmonary embolism. 2.  No pneumonia or pleural effusion. XR CHEST PORTABLE   Final Result   No acute process. EKG Interpretation  Time: 1719  Rhythm: normal sinus   Rate: 66  Axis: left  Conduction: right bundle branch block (incomplete)  ST Segments: no acute change  T Waves: no acute change  Clinical Impression: non-specific EKG  Comparison to prior EKG: no previous EKG      ------------------------- NURSING NOTES AND VITALS REVIEWED ---------------------------  The nursing notes within the ED encounter and vital signs as below have been reviewed by myself.     /64   Pulse 67   Temp 98.6 °F (37 °C)   Resp 19   Ht 5' 5\" (1.651 m)   Wt 187 lb (84.8 kg)   SpO2 96%   BMI 31.12 kg/m² Oxygen Saturation Interpretation: Normal      The patients available past medical records and past encounters were reviewed. ------------------------------ ED COURSE/MEDICAL DECISION MAKING----------------------  Medications   iopamidol (ISOVUE-370) 76 % injection 75 mL (75 mLs Intravenous Given 12/15/20 8041)           Procedures:   none      Medical Decision Making:      HEART Score For Major Cardiac Events  (Max Score 10 Points)  HISTORY       [x]   Slightly Suspicious  0       []   Moderately Suspicious  +1       []   Highly Suspicious  +2    EK point: No ST deviation but LBBB, LVH repolarization changes (ex:digoxin);               2 points: ST deviation not due to LBBB, LVH or digoxin         [x]   Normal  0       []   Nonspecific Repolarization Disturbance  +1       []   Significant ST Depression  +2    AGE       []   <45  0       [x]   45-64  +1       []    >65  +2    RISK FACTORS:  1. HTN    2. Hypercholesterolemia    3. DM     4. Cigarette smoking (current or cessation < 3 mos)    5. Positive family history  (parent or sibling with CVD before age 72).    6. Obesity (BMI >30kg/m2)         []   No Risk factors Known  0       []   1-2 Risk Factors  +1       [x]   >3 Risk Factors or History of Atherosclerotic Disease  +2      INITIAL TROPONIN       [x]   < Normal Limit   0       []   1-3 x Normal Limit   +1       []   >3 x Normal Limit   +2     -----------------------------------------------------------------------------------------------------------------  SCORE TOTAL:  3 POINTS     Low Score          (0-3 Points), risk of MACE of 0.9-1.7% (discuss d/c home with f/u)  Mod Score          (4-6 Points), risk of MACE of 12-16.6% (discuss admission for further testing)  High Score         (7-10 Points), risk of MACE of 50-65% (Admit ALL as they are candidates for early invasive measures)      This patient's ED course included: re-evaluation prior to disposition, cardiac monitoring, continuous pulse

## 2020-12-16 LAB
EKG ATRIAL RATE: 66 BPM
EKG P AXIS: 61 DEGREES
EKG P-R INTERVAL: 166 MS
EKG Q-T INTERVAL: 384 MS
EKG QRS DURATION: 102 MS
EKG QTC CALCULATION (BAZETT): 402 MS
EKG R AXIS: -32 DEGREES
EKG T AXIS: 36 DEGREES
EKG VENTRICULAR RATE: 66 BPM

## 2020-12-16 PROCEDURE — 93010 ELECTROCARDIOGRAM REPORT: CPT | Performed by: INTERNAL MEDICINE

## 2021-01-29 ENCOUNTER — PATIENT MESSAGE (OUTPATIENT)
Dept: FAMILY MEDICINE CLINIC | Age: 56
End: 2021-01-29

## 2021-01-29 DIAGNOSIS — M79.644 CHRONIC PAIN OF RIGHT THUMB: Primary | ICD-10-CM

## 2021-01-29 DIAGNOSIS — G89.29 CHRONIC PAIN OF RIGHT THUMB: Primary | ICD-10-CM

## 2021-02-05 ENCOUNTER — TELEPHONE (OUTPATIENT)
Dept: FAMILY MEDICINE CLINIC | Age: 56
End: 2021-02-05

## 2021-02-05 NOTE — TELEPHONE ENCOUNTER
Pt has appt with [facility] for right thumb pain. They are requesting imaging reports. Doctor H ordered x-rays for this in May pf last year, but the x-rays have not been resulted and are showing as \"active\". I need to know if patient had x-rays done some where other than [facility]? Or if these were just simply never done?

## 2021-02-19 NOTE — TELEPHONE ENCOUNTER
[FreeTextEntry1] : 24 y/o female with uncontrolled Type 1 DM. \par \par Plan: \par - check A1C now\par - increase Toujeo 14 units in am \par - continue Phuong sensor - needs to scan more\par - discussed the damaging effects to the body with high blood sugar \par - educated on the importance of annual retinopathy screening - information provided\par - schedule appointment with CDE in 2 weeks for carb counting and phuong download \par - continue to follow up at [facility] for alcoholism treatment  \par - Glucose Sensor Necessity: This patient with diabetes performs 4 or more glucose checks per day utilizing a continuous blood glucose monitor. The patient is treated with insulin via 3 or more injections daily. This patient requires frequent adjustments to their insulin treatment on the basis of therapeutic continuous glucose monitoring results.\par \par RTO in 4 weeks \par  meg calling in she did have these done at [facility]. She will drop a copy off here for our record. She also will be taking hard copies to her specialist appt.

## 2021-03-01 DIAGNOSIS — I10 ESSENTIAL HYPERTENSION: ICD-10-CM

## 2021-03-01 RX ORDER — LOSARTAN POTASSIUM AND HYDROCHLOROTHIAZIDE 25; 100 MG/1; MG/1
TABLET ORAL
Qty: 90 TABLET | Refills: 1 | Status: SHIPPED
Start: 2021-03-01 | End: 2021-03-04 | Stop reason: SDUPTHER

## 2021-03-01 RX ORDER — ATORVASTATIN CALCIUM 20 MG/1
20 TABLET, FILM COATED ORAL DAILY
Qty: 90 TABLET | Refills: 1 | Status: SHIPPED
Start: 2021-03-01 | End: 2021-03-04

## 2021-03-03 DIAGNOSIS — I10 ESSENTIAL HYPERTENSION: ICD-10-CM

## 2021-03-04 RX ORDER — LOSARTAN POTASSIUM AND HYDROCHLOROTHIAZIDE 25; 100 MG/1; MG/1
TABLET ORAL
Qty: 90 TABLET | Refills: 1 | Status: SHIPPED
Start: 2021-03-04 | End: 2021-08-27 | Stop reason: SDUPTHER

## 2021-03-04 RX ORDER — ATORVASTATIN CALCIUM 20 MG/1
TABLET, FILM COATED ORAL
Qty: 90 TABLET | Refills: 1 | Status: SHIPPED
Start: 2021-03-04 | End: 2021-08-27 | Stop reason: SDUPTHER

## 2021-07-07 ENCOUNTER — OFFICE VISIT (OUTPATIENT)
Dept: PODIATRY | Age: 56
End: 2021-07-07
Payer: COMMERCIAL

## 2021-07-07 VITALS — SYSTOLIC BLOOD PRESSURE: 136 MMHG | TEMPERATURE: 97.7 F | DIASTOLIC BLOOD PRESSURE: 78 MMHG

## 2021-07-07 DIAGNOSIS — M79.671 PAIN IN RIGHT FOOT: Primary | ICD-10-CM

## 2021-07-07 DIAGNOSIS — M21.961 DEFORMITY OF METATARSAL BONE OF RIGHT FOOT: ICD-10-CM

## 2021-07-07 PROCEDURE — 20600 DRAIN/INJ JOINT/BURSA W/O US: CPT | Performed by: PODIATRIST

## 2021-07-07 PROCEDURE — 1036F TOBACCO NON-USER: CPT | Performed by: PODIATRIST

## 2021-07-07 PROCEDURE — G8427 DOCREV CUR MEDS BY ELIG CLIN: HCPCS | Performed by: PODIATRIST

## 2021-07-07 PROCEDURE — G8417 CALC BMI ABV UP PARAM F/U: HCPCS | Performed by: PODIATRIST

## 2021-07-07 PROCEDURE — 99213 OFFICE O/P EST LOW 20 MIN: CPT | Performed by: PODIATRIST

## 2021-07-07 PROCEDURE — G9899 SCRN MAM PERF RSLTS DOC: HCPCS | Performed by: PODIATRIST

## 2021-07-07 PROCEDURE — 3017F COLORECTAL CA SCREEN DOC REV: CPT | Performed by: PODIATRIST

## 2021-07-07 RX ORDER — BUPIVACAINE HYDROCHLORIDE 5 MG/ML
1 INJECTION, SOLUTION PERINEURAL ONCE
Status: COMPLETED | OUTPATIENT
Start: 2021-07-07 | End: 2021-07-07

## 2021-07-07 RX ORDER — BETAMETHASONE SODIUM PHOSPHATE AND BETAMETHASONE ACETATE 3; 3 MG/ML; MG/ML
4 INJECTION, SUSPENSION INTRA-ARTICULAR; INTRALESIONAL; INTRAMUSCULAR; SOFT TISSUE ONCE
Status: COMPLETED | OUTPATIENT
Start: 2021-07-07 | End: 2021-07-07

## 2021-07-07 RX ADMIN — BUPIVACAINE HYDROCHLORIDE 5 MG: 5 INJECTION, SOLUTION PERINEURAL at 14:42

## 2021-07-07 RX ADMIN — BETAMETHASONE SODIUM PHOSPHATE AND BETAMETHASONE ACETATE 4 MG: 3; 3 INJECTION, SUSPENSION INTRA-ARTICULAR; INTRALESIONAL; INTRAMUSCULAR; SOFT TISSUE at 14:42

## 2021-07-07 NOTE — PATIENT INSTRUCTIONS
Surgery will be scheduled for 9/17/2021 at [facility] in [address]   They will contact you to give you all your preop instructions  Do not eat or drink anything after midnight on 9/16/2021 nothing morning of surgery  Call Hipolito Marroquin if any issues [phone]

## 2021-07-07 NOTE — PROGRESS NOTES
21  Fernanda Lorenzo : 1965 Sex: female  Age: 61 y.o. Patient was referred by Carroll Angel MD    Chief Complaint   Patient presents with   Chelsea Wallace Doctor     used to work at [employer] went to another foot dr he wanted to do a well osteotomy second digit right foot     Foot Pain       SUBJECTIVE this patient is seen regarding right foot pain. Patient was seen several years ago regarding the second digit. Patient states that the pain is progressively gotten worse at this time she cannot wear shoes walk stand without it bothering her.   Patient has been seen by other docs who recommended everything from an amputation to a wheel osteotomy  HPI  Review of Systems  Const: Denies constitutional symptoms  Musculo: Denies symptoms other than stated above  Skin: Denies symptoms other than stated above       Current Outpatient Medications:     atorvastatin (LIPITOR) 20 MG tablet, take 1 tablet by mouth once daily for cholesterol, Disp: 90 tablet, Rfl: 1    losartan-hydroCHLOROthiazide (HYZAAR) 100-25 MG per tablet, 1 tab daily, Disp: 90 tablet, Rfl: 1  Allergies   Allergen Reactions    Sulfa Antibiotics Hives, Shortness Of Breath and Palpitations       Past Medical History:   Diagnosis Date    Hyperlipidemia     Hypertension     Prolonged emergence from general anesthesia      Past Surgical History:   Procedure Laterality Date    BREAST REDUCTION SURGERY Bilateral 2019    BREAST REDUCTION SURGERY Bilateral 3/25/2019    BILATERAL BREAST REDUCTION performed by Jolynn Montes MD at [address]      x2    LAPAROSCOPY      right ovarian cyst    LAPAROSCOPY      right tubal preg     Family History   Problem Relation Age of Onset    Hypertension Father     Heart Attack Father     Stroke Mother     Hypertension Mother     Cancer Brother         renal    Kidney Cancer Brother     Thyroid Disease Sister     Diabetes Brother     Thyroid Disease Brother      Social History     Socioeconomic History    Marital status:      Spouse name: Not on file    Number of children: Not on file    Years of education: Not on file    Highest education level: Not on file   Occupational History    Not on file   Tobacco Use    Smoking status: Never Smoker    Smokeless tobacco: Never Used   Substance and Sexual Activity    Alcohol use: No    Drug use: No    Sexual activity: Yes     Partners: Male   Other Topics Concern    Not on file   Social History Narrative    Not on file     Social Determinants of Health     Financial Resource Strain:     Difficulty of Paying Living Expenses:    Food Insecurity:     Worried About Running Out of Food in the Last Year:     [address] in the Last Year:    Transportation Needs:     Lack of Transportation (Medical):      Lack of Transportation (Non-Medical):    Physical Activity:     Days of Exercise per Week:     Minutes of Exercise per Session:    Stress:     Feeling of Stress :    Social Connections:     Frequency of Communication with Friends and Family:     Frequency of Social Gatherings with Friends and Family:     Attends [employer] Services:     Active Member of Clubs or Organizations:     Attends Club or Organization Meetings:     Marital Status:    Intimate Partner Violence:     Fear of Current or Ex-Partner:     Emotionally Abused:     Physically Abused:     Sexually Abused:        Vitals:    07/07/21 1323   BP: 136/78   Temp: 97.7 °F (36.5 °C)   TempSrc: Tympanic   Weight: Comment: refused       Focused Lower Extremity Physical Exam:  Vitals:    07/07/21 1323   BP: 136/78   Temp: 97.7 °F (36.5 °C)        Foot Exam     Ortho Exam    Vascular: pulses  dp  pt palpable  Capillary Refill Time:   Hair growth  Skin:    Edema:    Neurologic:      Musculoskeletal/ Orthopedic examination: Examination of the right foot revealed a healed incision site over the dorsal second metatarsophalangeal joint. There was crepitus with dorsiflexion and plantarflexion there was pain with palpation both dorsal plantar medial and lateral at the second metatarsal phalangeal joint. Slightly contracted hammertoe noted. Both at the interphalangeal joint and the proximal interphalangeal joint. The x-ray was reviewed showing arthritic changes at the second metatarsophalangeal joint with a deviated digit  NAIL negative  Web space negative  Derm: Negative        Assessment and Plan: The I reviewed conservative versus surgical.  Conservative I will give an injection today of cortisone to help reduce the inflammation. But we did discuss this over 10 years ago about surgery I feel with the arthritis and the deviated plantar plate tear that the patient probably would do not well with it we will osteotomy I am recommending that we do a second MPJ fusion due to the surgical correction of her bunion. She does have custom made order biotics. We will schedule this sometime in the fall. Right foot second metatarsal phalangeal joint injection:  Potential risks, complications, alternative treatment options and procedure prognosis were explained to the patient. Verbal and signed informed consent were obtained from the patient. An antiseptic preparation of the skin was performed with ChloraPrep 1 cc of 0.5% Marcaine plain 1 cc of Celestone Soluspan second MPJ right foot no adverse reaction was observed. The puncture site was covered with an adhesive bandage and the patient was fitted with a removable metatarsal pad. Post injection instructions were given. Valdo Duncan was seen today for established new doctor and foot pain. 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

## 2021-07-09 ENCOUNTER — TELEPHONE (OUTPATIENT)
Dept: PODIATRY | Age: 56
End: 2021-07-09

## 2021-07-12 ENCOUNTER — PREP FOR PROCEDURE (OUTPATIENT)
Dept: PODIATRY | Age: 56
End: 2021-07-12

## 2021-07-12 RX ORDER — SODIUM CHLORIDE 0.9 % (FLUSH) 0.9 %
5-40 SYRINGE (ML) INJECTION EVERY 12 HOURS SCHEDULED
Status: CANCELLED | OUTPATIENT
Start: 2021-07-12

## 2021-07-12 RX ORDER — SODIUM CHLORIDE 0.9 % (FLUSH) 0.9 %
5-40 SYRINGE (ML) INJECTION PRN
Status: CANCELLED | OUTPATIENT
Start: 2021-07-12

## 2021-07-12 RX ORDER — SODIUM CHLORIDE 9 MG/ML
25 INJECTION, SOLUTION INTRAVENOUS PRN
Status: CANCELLED | OUTPATIENT
Start: 2021-07-12

## 2021-07-30 NOTE — TELEPHONE ENCOUNTER
Called [facility] to check on status of request. Per Get Garcia at [facility] case is still pending.  Call Ref # [id number]

## 2021-08-25 ENCOUNTER — PREP FOR PROCEDURE (OUTPATIENT)
Dept: PODIATRY | Age: 56
End: 2021-08-25

## 2021-08-25 ENCOUNTER — OFFICE VISIT (OUTPATIENT)
Dept: PODIATRY | Age: 56
End: 2021-08-25
Payer: COMMERCIAL

## 2021-08-25 VITALS
TEMPERATURE: 98.2 F | BODY MASS INDEX: 31.12 KG/M2 | WEIGHT: 187 LBS | SYSTOLIC BLOOD PRESSURE: 123 MMHG | DIASTOLIC BLOOD PRESSURE: 78 MMHG

## 2021-08-25 DIAGNOSIS — M20.41 HAMMER TOE OF RIGHT FOOT: ICD-10-CM

## 2021-08-25 DIAGNOSIS — M21.961 DEFORMITY OF METATARSAL BONE OF RIGHT FOOT: ICD-10-CM

## 2021-08-25 DIAGNOSIS — M79.671 PAIN IN RIGHT FOOT: Primary | ICD-10-CM

## 2021-08-25 PROCEDURE — 3017F COLORECTAL CA SCREEN DOC REV: CPT | Performed by: PODIATRIST

## 2021-08-25 PROCEDURE — G8417 CALC BMI ABV UP PARAM F/U: HCPCS | Performed by: PODIATRIST

## 2021-08-25 PROCEDURE — G9899 SCRN MAM PERF RSLTS DOC: HCPCS | Performed by: PODIATRIST

## 2021-08-25 PROCEDURE — 99213 OFFICE O/P EST LOW 20 MIN: CPT | Performed by: PODIATRIST

## 2021-08-25 PROCEDURE — G8427 DOCREV CUR MEDS BY ELIG CLIN: HCPCS | Performed by: PODIATRIST

## 2021-08-25 PROCEDURE — 1036F TOBACCO NON-USER: CPT | Performed by: PODIATRIST

## 2021-08-25 RX ORDER — SODIUM CHLORIDE 0.9 % (FLUSH) 0.9 %
5-40 SYRINGE (ML) INJECTION EVERY 12 HOURS SCHEDULED
Status: CANCELLED | OUTPATIENT
Start: 2021-08-25

## 2021-08-25 RX ORDER — SODIUM CHLORIDE 0.9 % (FLUSH) 0.9 %
5-40 SYRINGE (ML) INJECTION PRN
Status: CANCELLED | OUTPATIENT
Start: 2021-08-25

## 2021-08-25 RX ORDER — SODIUM CHLORIDE 9 MG/ML
25 INJECTION, SOLUTION INTRAVENOUS PRN
Status: CANCELLED | OUTPATIENT
Start: 2021-08-25

## 2021-08-25 NOTE — PROGRESS NOTES
21  Sanjiv Chang : 1965 Sex: female  Age: 61 y.o. Patient was referred by Melinda Clifton MD    Chief Complaint   Patient presents with    Foot Pain     preop evaluation for sx right foot on        SUBJECTIVE this patient is seen regarding right foot pain. Patient was seen several years ago regarding the second digit. Patient states that the pain is progressively gotten worse at this time she cannot wear shoes walk stand without it bothering her.   Patient has been seen by other docs who recommended everything from an amputation to a wheel osteotomy  HPI  Review of Systems  Const: Denies constitutional symptoms  Musculo: Denies symptoms other than stated above  Skin: Denies symptoms other than stated above       Current Outpatient Medications:     atorvastatin (LIPITOR) 20 MG tablet, take 1 tablet by mouth once daily for cholesterol, Disp: 90 tablet, Rfl: 1    losartan-hydroCHLOROthiazide (HYZAAR) 100-25 MG per tablet, 1 tab daily, Disp: 90 tablet, Rfl: 1  Allergies   Allergen Reactions    Sulfa Antibiotics Hives, Shortness Of Breath and Palpitations       Past Medical History:   Diagnosis Date    Hyperlipidemia     Hypertension     Prolonged emergence from general anesthesia      Past Surgical History:   Procedure Laterality Date    BREAST REDUCTION SURGERY Bilateral 2019    BREAST REDUCTION SURGERY Bilateral 3/25/2019    BILATERAL BREAST REDUCTION performed by Moris Gamboa MD at [facility]     [address]      x2    LAPAROSCOPY      right ovarian cyst    LAPAROSCOPY      right tubal preg     Family History   Problem Relation Age of Onset    Hypertension Father     Heart Attack Father     Stroke Mother     Hypertension Mother     Cancer Brother         renal    Kidney Cancer Brother     Thyroid Disease Sister     Diabetes Brother     Thyroid Disease Brother      Social History     Socioeconomic History    Exam    Vascular: pulses  dp  pt palpable  Capillary Refill Time:   Hair growth  Skin:    Edema:    Neurologic:      Musculoskeletal/ Orthopedic examination: Examination of the right foot revealed a healed incision site over the dorsal second metatarsophalangeal joint. There was crepitus with dorsiflexion and plantarflexion there was pain with palpation both dorsal plantar medial and lateral at the second metatarsal phalangeal joint. Slightly contracted hammertoe noted. Both at the interphalangeal joint and the proximal interphalangeal joint. The x-ray was reviewed showing arthritic changes at the second metatarsophalangeal joint with a deviated digit  NAIL negative  Web space negative  Derm: Negative        Assessment and Plan: Today after discussing with the patient surgery will be a fusion of the second metatarsophalangeal joint right foot along with an amputation at the distal phalanx. Patient was here with  to discuss the surgery patient agrees to the surgery and anesthesia patient realizes that her complications with both surgery and anesthesia she agrees to the procedure she is aware of all complications with anesthesia and the surgical procedure including under overcorrection loss of limb for the surgery she agrees to the procedure. Fco Suero was seen today for foot pain. 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 Seen By:  Bigg Guzman DPM      Document was created using voice recognition software. Note was reviewed, however may contain grammatical errors.

## 2021-08-27 ENCOUNTER — OFFICE VISIT (OUTPATIENT)
Dept: FAMILY MEDICINE CLINIC | Age: 56
End: 2021-08-27
Payer: COMMERCIAL

## 2021-08-27 VITALS
DIASTOLIC BLOOD PRESSURE: 76 MMHG | WEIGHT: 209 LBS | TEMPERATURE: 97.7 F | SYSTOLIC BLOOD PRESSURE: 122 MMHG | OXYGEN SATURATION: 98 % | BODY MASS INDEX: 34.82 KG/M2 | HEART RATE: 65 BPM | HEIGHT: 65 IN

## 2021-08-27 DIAGNOSIS — I10 ESSENTIAL HYPERTENSION: ICD-10-CM

## 2021-08-27 DIAGNOSIS — E78.5 HYPERLIPIDEMIA, UNSPECIFIED HYPERLIPIDEMIA TYPE: ICD-10-CM

## 2021-08-27 DIAGNOSIS — Z01.818 PREOPERATIVE CLEARANCE: Primary | ICD-10-CM

## 2021-08-27 DIAGNOSIS — L05.91 CYST NEAR COCCYX: ICD-10-CM

## 2021-08-27 PROCEDURE — 99214 OFFICE O/P EST MOD 30 MIN: CPT | Performed by: FAMILY MEDICINE

## 2021-08-27 PROCEDURE — 3017F COLORECTAL CA SCREEN DOC REV: CPT | Performed by: FAMILY MEDICINE

## 2021-08-27 PROCEDURE — G8427 DOCREV CUR MEDS BY ELIG CLIN: HCPCS | Performed by: FAMILY MEDICINE

## 2021-08-27 PROCEDURE — G8417 CALC BMI ABV UP PARAM F/U: HCPCS | Performed by: FAMILY MEDICINE

## 2021-08-27 PROCEDURE — G9899 SCRN MAM PERF RSLTS DOC: HCPCS | Performed by: FAMILY MEDICINE

## 2021-08-27 PROCEDURE — 1036F TOBACCO NON-USER: CPT | Performed by: FAMILY MEDICINE

## 2021-08-27 RX ORDER — ATORVASTATIN CALCIUM 20 MG/1
TABLET, FILM COATED ORAL
Qty: 90 TABLET | Refills: 3 | Status: SHIPPED
Start: 2021-08-27 | End: 2022-07-18 | Stop reason: SDUPTHER

## 2021-08-27 RX ORDER — LOSARTAN POTASSIUM AND HYDROCHLOROTHIAZIDE 25; 100 MG/1; MG/1
TABLET ORAL
Qty: 90 TABLET | Refills: 3 | Status: SHIPPED
Start: 2021-08-27 | End: 2022-07-18 | Stop reason: SDUPTHER

## 2021-08-27 ASSESSMENT — PATIENT HEALTH QUESTIONNAIRE - PHQ9
SUM OF ALL RESPONSES TO PHQ9 QUESTIONS 1 & 2: 0
SUM OF ALL RESPONSES TO PHQ QUESTIONS 1-9: 0
1. LITTLE INTEREST OR PLEASURE IN DOING THINGS: 0
2. FEELING DOWN, DEPRESSED OR HOPELESS: 0
SUM OF ALL RESPONSES TO PHQ QUESTIONS 1-9: 0
SUM OF ALL RESPONSES TO PHQ QUESTIONS 1-9: 0

## 2021-08-27 NOTE — PROGRESS NOTES
[facility]  Office Progress Note - Dr. Felix Torres  8/27/21    CC:   Chief Complaint   Patient presents with    Pre-op Exam     Foot Surgery - 09/24/21 Dr. Monique Pryor Skin Problem     Small growth on left buttock. Somewhat painful. /76 (Site: Left Upper Arm, Position: Sitting, Cuff Size: Large Adult)   Pulse 65   Temp 97.7 °F (36.5 °C) (Temporal)   Ht 5' 5\" (1.651 m)   Wt 209 lb (94.8 kg)   SpO2 98%   BMI 34.78 kg/m²   Wt Readings from Last 3 Encounters:   08/27/21 209 lb (94.8 kg)   08/25/21 187 lb (84.8 kg)   12/15/20 187 lb (84.8 kg)       HPI:  Pre-op Foot surgery  Toe fusion  Had Bunion surgery  And other surgeries   No problems with anaesthesia, but possibly takes more time to wake up. Denies nausea, vomiting, other problems with anesthesia historically. 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 No history of clotting, bleeding, blood thinners, blood transfusions, etc.    Skin growth   For > 3 years   Noticed changes appearance   Was flat and got filled up with blood sometime and  drained , noticed blood and now it is again becoming flat   Has to drain couple of times  Minimal bleeding   Bothers when cloth rubs on it, 2/10  Sister had basal cell Ca on leg  Location is on the left buttock toward the gluteal cleft. Size is about 1 cm. Hypertension  Follow-up  Blood pressure is controlled today. BP Readings from Last 3 Encounters:   08/27/21 122/76   08/25/21 123/78   07/07/21 136/78     Patient continues medications regularly. Compliance is good. Denies CP, sob, abd pain, headaches, vision changes, dizziness, hypotensive symptoms. No side effects from medications noted.     _________________________________________________________    Assessment / Tedra Jubilee was seen today for pre-op exam and skin problem. Diagnoses and all orders for this visit:    Preoperative clearance  Patient doing well. Asymptomatic, no worrisome symptoms. Low risk podiatric surgery. No previous problem with anesthesia. Allergy to sulfa. Given patient in normal state of health without new or unexplained symptoms, she can proceed for this low risk elective surgery without further work-up. Essential hypertension  -     losartan-hydroCHLOROthiazide (HYZAAR) 100-25 MG per tablet; 1 tab daily  Well-controlled. Continue same dose of medication. Cyst near coccyx  Refer to dermatology for removal.  Suspect sebaceous cyst which occasionally drains. Hyperlipidemia  -     atorvastatin (LIPITOR) 20 MG tablet; take 1 tablet by mouth once daily for cholesterol  Stable, continue same. Update labs next year. 1 year or as scheduled. Patient counseled to follow up sooner or seek more acute care if symptoms worsening or not improving according to plan. Electronically signed by Melinda Clifton MD on 8/27/2021    _________________________________________________________  No current outpatient medications on file prior to visit.      Current Facility-Administered Medications on File Prior to Visit   Medication Dose Route Frequency Provider Last Rate Last Admin    lidocaine-EPINEPHrine 1 percent-1:[id number] injection 1 mL  1 mL Intradermal Once Meghana Slaas MD           Patient Active Problem List   Diagnosis Code    Absence of menstruation N91.2    Essential hypertension I10    Mixed hyperlipidemia E78.2    Long term current use of diuretic Z79.899    Posterior tibial tendon dysfunction, right M76.821     _________________________________________________________  Past Medical History:   Diagnosis Date    Hyperlipidemia     Hypertension     Prolonged emergence from general anesthesia        Family History   Problem Relation Age of Onset    Hypertension Father     Heart Attack Father     Stroke Mother    Faustina Snider Hypertension Mother     Cancer Brother         renal    Kidney Cancer Brother     Thyroid Disease Sister     Diabetes Brother     Thyroid Disease Brother        Past Surgical History:   Procedure Laterality Date    BREAST REDUCTION SURGERY Bilateral 03/25/2019    BREAST REDUCTION SURGERY Bilateral 3/25/2019    BILATERAL BREAST REDUCTION performed [facility]ri [facility]  2005    FOOT SURGERY      x2    LAPAROSCOPY  2007    right ovarian cyst    LAPAROSCOPY  2000    right tubal preg       Social History     Tobacco Use    Smoking status: Never Smoker    Smokeless tobacco: Never Used   Substance Use Topics    Alcohol use: No    Drug use: No       Chart reviewed and updated where appropriate for PMH, Fam, and Soc Hx.  _________________________________________________________  ROS: POSITIVE: As in the HPI. Otherwise Pertinent negatives are negative.    __________________________________________________________  Physical Exam   Constitutional:    She is oriented to person, place, and time. She appears well-developed and well-nourished. Eyes:    Conjunctivae are normal.    Pupils are equal, round, and reactive to light. EOMI. Neck:    Normal range of motion. No thyromegaly or nodules noted. No bruit. No LAD. Cardiovascular:    Normal rate, regular rhythm and normal heart sounds. No murmur. No gallop and no friction rub. Pulmonary/Chest:    Effort normal and breath sounds normal.    No wheezes. No rales or rhonchi. Abdominal:    Soft. Bowel sounds are normal.    No distension. No tenderness. Musculoskeletal:               Hammer toe -second digit on right     Normal range of motion. No joint swelling noted. No peripheral edema. Skin:    Skin is warm and dry. Pustule with white colored head that contain minimal pus with firm base about 2 cm lateral of the gluteal cleft on the left buttock. No erythema.   Not tender today.  Psychiatric:    She has a normal mood and affect. Normal groom and dress. No SI or HI.   ________________________________________________________    This note may have been created using dictation software.  Efforts were made to reduce errors, but some may persist.

## 2021-09-22 NOTE — PROGRESS NOTES
Have you been tested for COVID  No  vaccinated         Have you been told you were positive for COVID No  Have you had any known exposure to someone that is positive for COVID No  Do you have a cough                   No              Do you have shortness of breath No                 Do you have a sore throat            No                Are you having chills                    No                Are you having muscle aches. No                    Please come to the hospital wearing a mask and have your significant other wear a mask as well. Both of you should check your temperature before leaving to come here,  if it is 100 or higher please call [phone] for instruction.

## 2021-09-22 NOTE — PROGRESS NOTES
[facility] PRE-ADMISSION TESTING INSTRUCTIONS    The Preadmission Testing patient is instructed accordingly using the following criteria (check applicable):    ARRIVAL INSTRUCTIONS:  [x] Parking the day of Surgery is located in the Main Entrance lot. Upon entering the door, make an immediate right to the surgery reception desk    [x] Bring photo ID and insurance card    [] Bring in a copy of Living will or Durable Power of  papers. [x] Please be sure to arrange for responsible adult to provide transportation to and from the hospital    [x] Please arrange for responsible adult to be with you for the 24 hour period post procedure due to having anesthesia      GENERAL INSTRUCTIONS:    [x] Nothing by mouth after midnight, including gum, candy, mints or water    [x] You may brush your teeth, but do not swallow any water    [] Take medications as instructed with 1-2 oz of water    [] Stop herbal supplements and vitamins 5 days prior to procedure    [] Follow preop dosing of blood thinners per physician instructions    [] Take 1/2 dose of evening insulin, but no insulin after midnight    [] No oral diabetic medications after midnight    [] If diabetic and have low blood sugar or feel symptomatic, take 1-2oz apple juice only    [] Bring inhalers day of surgery    [] Bring C-PAP/ Bi-Pap day of surgery    [] Bring urine specimen day of surgery    [x] Shower or bath with soap, lather and rinse well, AM of Surgery, no lotion, powders or creams    [] Follow bowel prep as instructed per surgeon    [x] No tobacco products within 24 hours of surgery     [x] No alcohol or illegal drug use within 24 hours of surgery.     [x] Jewelry, body piercing's, eyeglasses, contact lenses and dentures are not permitted into surgery (bring cases)      [x] Please do not wear any nail polish, make up or hair products on the day of surgery    [x] You can expect a call the business day prior to procedure to notify you if your arrival time changes    [x] If you receive a survey after surgery we would greatly appreciate your comments    [] Parent/guardian of a minor must accompany their child and remain on the premises  the entire time they are under our care     [] Pediatric patients may bring favorite toy, blanket or comfort item with them    [] A caregiver or family member must remain with the patient during their stay if they are mentally handicapped, have dementia, disoriented or unable to use a call light or would be a safety concern if left unattended    [x] Please notify surgeon if you develop any illness between now and time of surgery (cold, cough, sore throat, fever, nausea, vomiting) or any signs of infections  including skin, wounds, and dental.    [x]  The Outpatient Pharmacy is available to fill your prescription here on your day of surgery, ask your preop nurse for details    [x] Other instructions: wear loose, comfortable clothing  EDUCATIONAL MATERIALS PROVIDED:    [] PAT Preoperative Education Packet/Booklet     [] Medication List    [] Transfusion bracelet applied with instructions    [] Shower with soap, lather and rinse well, and use CHG wipes provided the evening before surgery as instructed    [] Incentive spirometer with instructions

## 2021-09-23 ENCOUNTER — ANESTHESIA EVENT (OUTPATIENT)
Dept: OPERATING ROOM | Age: 56
End: 2021-09-23
Payer: COMMERCIAL

## 2021-09-24 ENCOUNTER — APPOINTMENT (OUTPATIENT)
Dept: GENERAL RADIOLOGY | Age: 56
End: 2021-09-24
Attending: PODIATRIST
Payer: COMMERCIAL

## 2021-09-24 ENCOUNTER — ANESTHESIA (OUTPATIENT)
Dept: OPERATING ROOM | Age: 56
End: 2021-09-24
Payer: COMMERCIAL

## 2021-09-24 ENCOUNTER — HOSPITAL ENCOUNTER (OUTPATIENT)
Age: 56
Setting detail: OUTPATIENT SURGERY
Discharge: HOME | End: 2021-09-24
Attending: PODIATRIST | Admitting: PODIATRIST
Payer: COMMERCIAL

## 2021-09-24 VITALS
DIASTOLIC BLOOD PRESSURE: 56 MMHG | RESPIRATION RATE: 16 BRPM | SYSTOLIC BLOOD PRESSURE: 113 MMHG | HEART RATE: 64 BPM | HEIGHT: 65 IN | TEMPERATURE: 97.5 F | BODY MASS INDEX: 34.82 KG/M2 | WEIGHT: 209 LBS | OXYGEN SATURATION: 96 %

## 2021-09-24 VITALS — OXYGEN SATURATION: 95 % | SYSTOLIC BLOOD PRESSURE: 125 MMHG | DIASTOLIC BLOOD PRESSURE: 62 MMHG

## 2021-09-24 DIAGNOSIS — M76.821 POSTERIOR TIBIAL TENDON DYSFUNCTION, RIGHT: Primary | ICD-10-CM

## 2021-09-24 DIAGNOSIS — G89.18 POST-OPERATIVE PAIN: Primary | ICD-10-CM

## 2021-09-24 PROCEDURE — 28825 PARTIAL AMPUTATION OF TOE: CPT | Performed by: PODIATRIST

## 2021-09-24 PROCEDURE — 88311 DECALCIFY TISSUE: CPT

## 2021-09-24 PROCEDURE — 3600000002 HC SURGERY LEVEL 2 BASE: Performed by: PODIATRIST

## 2021-09-24 PROCEDURE — 2720000001 HC MISC SURG SUPPLY STERILE $51-500: Performed by: PODIATRIST

## 2021-09-24 PROCEDURE — 7100000011 HC PHASE II RECOVERY - ADDTL 15 MIN: Performed by: PODIATRIST

## 2021-09-24 PROCEDURE — 3700000000 HC ANESTHESIA ATTENDED CARE: Performed by: PODIATRIST

## 2021-09-24 PROCEDURE — 2720000010 HC SURG SUPPLY STERILE: Performed by: PODIATRIST

## 2021-09-24 PROCEDURE — 6360000002 HC RX W HCPCS: Performed by: PODIATRIST

## 2021-09-24 PROCEDURE — 3700000001 HC ADD 15 MINUTES (ANESTHESIA): Performed by: PODIATRIST

## 2021-09-24 PROCEDURE — C1762 CONN TISS, HUMAN(INC FASCIA): HCPCS | Performed by: PODIATRIST

## 2021-09-24 PROCEDURE — C1713 ANCHOR/SCREW BN/BN,TIS/BN: HCPCS | Performed by: PODIATRIST

## 2021-09-24 PROCEDURE — 28899 UNLISTED PX FOOT/TOES: CPT | Performed by: PODIATRIST

## 2021-09-24 PROCEDURE — 6360000002 HC RX W HCPCS

## 2021-09-24 PROCEDURE — 3600000012 HC SURGERY LEVEL 2 ADDTL 15MIN: Performed by: PODIATRIST

## 2021-09-24 PROCEDURE — 7100000010 HC PHASE II RECOVERY - FIRST 15 MIN: Performed by: PODIATRIST

## 2021-09-24 PROCEDURE — 2580000003 HC RX 258: Performed by: PODIATRIST

## 2021-09-24 PROCEDURE — 2709999900 HC NON-CHARGEABLE SUPPLY: Performed by: PODIATRIST

## 2021-09-24 PROCEDURE — 3209999900 FLUORO FOR SURGICAL PROCEDURES

## 2021-09-24 PROCEDURE — 2500000003 HC RX 250 WO HCPCS: Performed by: PODIATRIST

## 2021-09-24 PROCEDURE — 88305 TISSUE EXAM BY PATHOLOGIST: CPT

## 2021-09-24 DEVICE — GRAFT BONE 2.5CC HCT/P 361 REGULATED VIABLE CELLULAR: Type: IMPLANTABLE DEVICE | Site: FOOT | Status: FUNCTIONAL

## 2021-09-24 RX ORDER — FENTANYL CITRATE 50 UG/ML
50 INJECTION, SOLUTION INTRAMUSCULAR; INTRAVENOUS EVERY 5 MIN PRN
Status: DISCONTINUED | OUTPATIENT
Start: 2021-09-24 | End: 2021-09-24 | Stop reason: HOSPADM

## 2021-09-24 RX ORDER — SODIUM CHLORIDE 9 MG/ML
25 INJECTION, SOLUTION INTRAVENOUS PRN
Status: DISCONTINUED | OUTPATIENT
Start: 2021-09-24 | End: 2021-09-24 | Stop reason: HOSPADM

## 2021-09-24 RX ORDER — ONDANSETRON 2 MG/ML
4 INJECTION INTRAMUSCULAR; INTRAVENOUS
Status: DISCONTINUED | OUTPATIENT
Start: 2021-09-24 | End: 2021-09-24 | Stop reason: HOSPADM

## 2021-09-24 RX ORDER — SODIUM CHLORIDE 0.9 % (FLUSH) 0.9 %
5-40 SYRINGE (ML) INJECTION EVERY 12 HOURS SCHEDULED
Status: DISCONTINUED | OUTPATIENT
Start: 2021-09-24 | End: 2021-09-24 | Stop reason: HOSPADM

## 2021-09-24 RX ORDER — FENTANYL CITRATE 50 UG/ML
25 INJECTION, SOLUTION INTRAMUSCULAR; INTRAVENOUS EVERY 5 MIN PRN
Status: DISCONTINUED | OUTPATIENT
Start: 2021-09-24 | End: 2021-09-24 | Stop reason: HOSPADM

## 2021-09-24 RX ORDER — FENTANYL CITRATE 50 UG/ML
INJECTION, SOLUTION INTRAMUSCULAR; INTRAVENOUS PRN
Status: DISCONTINUED | OUTPATIENT
Start: 2021-09-24 | End: 2021-09-24 | Stop reason: SDUPTHER

## 2021-09-24 RX ORDER — SODIUM CHLORIDE 0.9 % (FLUSH) 0.9 %
5-40 SYRINGE (ML) INJECTION PRN
Status: DISCONTINUED | OUTPATIENT
Start: 2021-09-24 | End: 2021-09-24 | Stop reason: HOSPADM

## 2021-09-24 RX ORDER — OXYCODONE HYDROCHLORIDE AND ACETAMINOPHEN 5; 325 MG/1; MG/1
1 TABLET ORAL EVERY 6 HOURS PRN
Qty: 20 TABLET | Refills: 0 | Status: SHIPPED | OUTPATIENT
Start: 2021-09-24 | End: 2021-09-29

## 2021-09-24 RX ORDER — PROPOFOL 10 MG/ML
INJECTION, EMULSION INTRAVENOUS CONTINUOUS PRN
Status: DISCONTINUED | OUTPATIENT
Start: 2021-09-24 | End: 2021-09-24 | Stop reason: SDUPTHER

## 2021-09-24 RX ORDER — MEPERIDINE HYDROCHLORIDE 25 MG/ML
12.5 INJECTION INTRAMUSCULAR; INTRAVENOUS; SUBCUTANEOUS EVERY 5 MIN PRN
Status: DISCONTINUED | OUTPATIENT
Start: 2021-09-24 | End: 2021-09-24 | Stop reason: HOSPADM

## 2021-09-24 RX ORDER — PROPOFOL 10 MG/ML
INJECTION, EMULSION INTRAVENOUS PRN
Status: DISCONTINUED | OUTPATIENT
Start: 2021-09-24 | End: 2021-09-24

## 2021-09-24 RX ORDER — BUPIVACAINE HYDROCHLORIDE 5 MG/ML
INJECTION, SOLUTION EPIDURAL; INTRACAUDAL PRN
Status: DISCONTINUED | OUTPATIENT
Start: 2021-09-24 | End: 2021-09-24 | Stop reason: ALTCHOICE

## 2021-09-24 RX ORDER — MIDAZOLAM HYDROCHLORIDE 1 MG/ML
INJECTION INTRAMUSCULAR; INTRAVENOUS PRN
Status: DISCONTINUED | OUTPATIENT
Start: 2021-09-24 | End: 2021-09-24 | Stop reason: SDUPTHER

## 2021-09-24 RX ADMIN — Medication 2000 MG: at 08:25

## 2021-09-24 RX ADMIN — FENTANYL CITRATE 25 MCG: 50 INJECTION, SOLUTION INTRAMUSCULAR; INTRAVENOUS at 09:55

## 2021-09-24 RX ADMIN — PROPOFOL 75 MCG/KG/MIN: 10 INJECTION, EMULSION INTRAVENOUS at 08:24

## 2021-09-24 RX ADMIN — MIDAZOLAM 2 MG: 1 INJECTION INTRAMUSCULAR; INTRAVENOUS at 08:15

## 2021-09-24 RX ADMIN — FENTANYL CITRATE 50 MCG: 50 INJECTION, SOLUTION INTRAMUSCULAR; INTRAVENOUS at 08:40

## 2021-09-24 RX ADMIN — PHENYLEPHRINE HYDROCHLORIDE 100 MCG: 10 INJECTION INTRAVENOUS at 09:05

## 2021-09-24 RX ADMIN — SODIUM CHLORIDE: 9 INJECTION, SOLUTION INTRAVENOUS at 08:15

## 2021-09-24 RX ADMIN — PHENYLEPHRINE HYDROCHLORIDE 50 MCG: 10 INJECTION INTRAVENOUS at 09:13

## 2021-09-24 ASSESSMENT — PULMONARY FUNCTION TESTS
PIF_VALUE: 1
PIF_VALUE: 0
PIF_VALUE: 1
PIF_VALUE: 0
PIF_VALUE: 1
PIF_VALUE: 0
PIF_VALUE: 1

## 2021-09-24 ASSESSMENT — PAIN - FUNCTIONAL ASSESSMENT: PAIN_FUNCTIONAL_ASSESSMENT: 0-10

## 2021-09-24 ASSESSMENT — PAIN DESCRIPTION - PAIN TYPE
TYPE: SURGICAL PAIN
TYPE: SURGICAL PAIN

## 2021-09-24 ASSESSMENT — PAIN DESCRIPTION - ORIENTATION
ORIENTATION: RIGHT
ORIENTATION: RIGHT

## 2021-09-24 ASSESSMENT — PAIN SCALES - GENERAL: PAINLEVEL_OUTOF10: 0

## 2021-09-24 ASSESSMENT — PAIN DESCRIPTION - LOCATION
LOCATION: FOOT
LOCATION: FOOT

## 2021-09-24 NOTE — BRIEF OP NOTE
Brief Postoperative Note      Patient: Susan Edge  YOB: 1965  MRN: [id number]    Date of Procedure: 9/24/2021    Pre-Op Diagnosis: PAINFUL SECOND MPJ RIGHT FOOT AND HAMMER SECOND MPJ RIGHT FOOT    Post-Op Diagnosis: Same       Procedure(s):  ARTHRODESIS SECOND MPJ RIGHT FOOT ARTHRODESIS SECOND IPJ RIGHT FOOT +++ARTHREX+++   ++ARTHROCELL++  +PLANTER PLATE+    Surgeon(s):  Sindhu Luque DPM    Assistant:  Resident: Sade Saunders DPM    Anesthesia: Monitor Anesthesia Care    Estimated Blood Loss (mL): 5mL    Complications: None    Specimens:   ID Type Source Tests Collected by Time Destination   A : RIGHT 2ND TOE Specimen Toe SURGICAL PATHOLOGY Sindhu Luque DPM 9/24/2021 7146        Implants:  Implant Name Type Inv.  Item Serial No.  Lot No. LRB No. Used Action   GRAFT BONE 2.5CC HCT/P 361 REGULATED VIABLE CELLULAR  GRAFT BONE 2.5CC HCT/P 361 REGULATED VIABLE CELLULAR  [employer]WD [id number] Right 1 Implanted   ROTATION PLATE     [employer]PMM  Right 1 Implanted   1.6MM LOCK SCREW    [employer]PMM  Right 2 Implanted   1.6 MM LOCK SCREW    [employer]PMM  Right 2 Implanted   1.6MM CORTEX SCREW    [employer]PMM  Right 1 Implanted         Drains: * No LDAs found *    Findings: See dictated op note    Electronically signed by Sade Saunders DPM on 9/24/2021 at 10:01 AM

## 2021-09-24 NOTE — OP NOTE
Operative Note      Patient: Lary Diego  YOB: 1965  MRN: [id number]    Date of Procedure: 9/24/2021    Pre-Op Diagnosis: PAINFUL SECOND MPJ RIGHT FOOT AND HAMMER SECOND MPJ RIGHT FOOT    Post-Op Diagnosis: Same       Procedure(s):  ARTHRODESIS SECOND MPJ RIGHT FOOT ARTHRODESIS SECOND IPJ RIGHT FOOT +++ARTHREX+++   ++ARTHROCELL++  +PLANTER PLATE+    Surgeon(s):  Keila Romero DPM    Assistant:   Resident: Anita Rios DPM    Anesthesia: Monitor Anesthesia Care    Estimated Blood Loss (mL): 5mL    Complications: None    Specimens:   ID Type Source Tests Collected by Time Destination   A : RIGHT 2ND TOE Specimen Toe SURGICAL PATHOLOGY Keila Romero DPM 9/24/2021 8311        Implants:  Implant Name Type Inv. Item Serial No.  Lot No. LRB No. Used Action   GRAFT BONE 2.5CC HCT/P 361 REGULATED VIABLE CELLULAR  GRAFT BONE 2.5CC HCT/P 361 REGULATED VIABLE CELLULAR  [employer] [id number] Right 1 Implanted   ROTATION PLATE     [employer]PMM  Right 1 Implanted   1.6MM LOCK SCREW    [employer]PMM  Right 2 Implanted   1.6 MM LOCK SCREW    [employer]PMM  Right 2 Implanted   1.6MM CORTEX SCREW    [employer]PMM  Right 1 Implanted         Drains: * No LDAs found *    Detailed Description of Procedure:     Anesthesia: Monitored Local Anesthesia with Sedation    Hemostasis    Injectibles: 15ccs . 5% marcaine plain. Total 15ccs           Condition: Stable    Procedure Details   Following satisfactory pre-op evaluation the patient was brought into the OR and placed on the OR table in the supine position. MAC sedation was administered by anesthesia. Following sedation a time out was then called identifying patient identity, procedure, operative location, birth date, allergies, antibiotics and other pertinent information in regards to the surgery to which everyone agreed.  At this time, 10ccs of 0.5% Marcaine plain was then injected into the right foot in a ring fashion around the second metatarsal head. The foot was then prepped and draped in the usual sterile manner and lowered onto the surgical field. Attention was then directed to the to the dorsal aspect of the 2nd metatarsal where a 6cm incision was made through the skin using a #15 blade. The incision was deepened to capsule using a #15 blade making sure to protect all neuro vascular structures. Next attention was directed to the 2nd MPJ where using sharp dissection the metatarsal head and base of the proximal phalanx were released from all soft tissue attachments. Next a .39  k-wire was inserted in the head of the 2nd metatarsal the was reamed, this was followed by the same steps to ream the base of the proximal phalanx. Next the Arthrex rotation was applied to the top of the joint and with c-arm the plate was placed in its desired position. Next the 2nd MPJ was packed with arthroset. Our attention was then directed to the distal aspect of the 2nd toe where a fish mouth incision was drawn and the distal phalanx was removed. This incision was then closed with 3-O nylon simple stitches. Next our attention was then directed back to the previous dorsal incision. Using a mix of locking and non-locking screws the plate was fixated to the 2nd metatarsal and proximal phalanx fusing the 2nd MPJ. Final fluoroscopic images were then taken. The area was then cleaned by bulb. The subcutaneous tissue was then reapproximated using 2-O vicryl suture by simple stitch. The skin was then coapted using 3-0 nylon by simple stitch. A dressing was applied consisting of Jumpstart, 4x4 gauze, conform and an ace wrap. The patient tolerated anesthesia and the procedure well and was transported back to PACU with VSS and VSI to the right foot. Orders for the following were written:    1. resume all pre-op medications, orders, and diet  2. Keep dressing CDI  3.  NWB to right foot  4. elevate right foot with pillow under leg      Attending Attestation: I was present

## 2021-09-24 NOTE — ANESTHESIA PRE PROCEDURE
Department of Anesthesiology  Preprocedure Note       Name:  Anita De Paz   Age:  61 y.o.  :  1965                                          MRN:  [id number]         Date:  2021      Surgeon: Shahana Genao):  Corina Stevenson DPM    Procedure: Procedure(s):  ARTHRODESIS SECOND MPJ RIGHT FOOT ARTHRODESIS SECOND IPJ RIGHT FOOT +++ARTHREX+++   ++ARTHROCELL++  +PLANTER PLATE+    Medications prior to admission:   Prior to Admission medications    Medication Sig Start Date End Date Taking? Authorizing Provider   atorvastatin (LIPITOR) 20 MG tablet take 1 tablet by mouth once daily for cholesterol 21  Yes Dwayne Gallegos MD   losartan-hydroCHLOROthiazide [facility]) 100-25 MG per tablet 1 tab daily 21  Yes Dwayne Gallegos MD       Current medications:    Current Facility-Administered Medications   Medication Dose Route Frequency Provider Last Rate Last Admin    0.9 % sodium chloride infusion  25 mL IntraVENous PRN Corina Search, DPM        0.9 % sodium chloride infusion  25 mL IntraVENous PRN Corina Search, DPM        ceFAZolin (ANCEF) 2000 mg in sterile water 20 mL IV syringe  2,000 mg IntraVENous On Call to [address], DPM        sodium chloride flush 0.9 % injection 5-40 mL  5-40 mL IntraVENous 2 times per day Corina Search, DPM        sodium chloride flush 0.9 % injection 5-40 mL  5-40 mL IntraVENous PRN Corina Search, DPM        sodium chloride flush 0.9 % injection 5-40 mL  5-40 mL IntraVENous 2 times per day Corina Search, DPM        sodium chloride flush 0.9 % injection 5-40 mL  5-40 mL IntraVENous PRN Corina Search, DPM           Allergies:     Allergies   Allergen Reactions    Sulfa Antibiotics Hives, Shortness Of Breath and Palpitations       Problem List:    Patient Active Problem List   Diagnosis Code    Absence of menstruation N91.2    Essential hypertension I10    Mixed hyperlipidemia E78.2    Long term current use of diuretic Z79.899    Posterior tibial tendon dysfunction, right M76.821       Past Medical History:        Diagnosis Date    Hyperlipidemia     Hypertension     Prolonged emergence from general anesthesia        Past Surgical History:        Procedure Laterality Date    BREAST REDUCTION SURGERY Bilateral 03/25/2019    BREAST REDUCTION SURGERY Bilateral 3/25/2019    BILATERAL BREAST REDUCTION performed by Will Serrano [facility] at [facility]  2005    FOOT SURGERY      x2    LAPAROSCOPY  2007    right ovarian cyst    LAPAROSCOPY  2000    right tubal preg       Social History:    Social History     Tobacco Use    Smoking status: Never Smoker    Smokeless tobacco: Never Used   Substance Use Topics    Alcohol use: No                                Counseling given: Not Answered      Vital Signs (Current):   Vitals:    09/22/21 1504   Weight: 209 lb (94.8 kg)   Height: 5' 5\" (1.651 m)                                              BP Readings from Last 3 Encounters:   08/27/21 122/76   08/25/21 123/78   07/07/21 136/78       NPO Status:                                                                                 BMI:   Wt Readings from Last 3 Encounters:   09/22/21 209 lb (94.8 kg)   08/27/21 209 lb (94.8 kg)   08/25/21 187 lb (84.8 kg)     Body mass index is 34.78 kg/m².     CBC:   Lab Results   Component Value Date    WBC 6.9 12/15/2020    RBC 4.74 12/15/2020    HGB 14.5 12/15/2020    HCT 42.3 12/15/2020    MCV 89.2 12/15/2020    RDW 12.8 12/15/2020     12/15/2020       CMP:   Lab Results   Component Value Date     12/15/2020    K 3.7 12/15/2020     12/15/2020    CO2 28 12/15/2020    BUN 19 12/15/2020    CREATININE 0.7 12/15/2020    GFRAA >60 12/15/2020    LABGLOM >60 12/15/2020    GLUCOSE 108 12/15/2020    PROT 7.3 12/15/2020    CALCIUM 10.5 12/15/2020    BILITOT 0.5 12/15/2020    ALKPHOS 72 12/15/2020    AST 26 12/15/2020    ALT 31 12/15/2020       POC Tests: No results for input(s): POCGLU, POCNA, POCK, POCCL, POCBUN, POCHEMO, POCHCT in the last 72 hours. Coags: No results found for: PROTIME, INR, APTT    HCG (If Applicable): No results found for: PREGTESTUR, PREGSERUM, HCG, HCGQUANT     ABGs: No results found for: PHART, PO2ART, ZBK4HJT, WRP4HLA, BEART, W7YAOYVZ     Type & Screen (If Applicable):  No results found for: LABABO, LABRH    Drug/Infectious Status (If Applicable):  No results found for: HIV, HEPCAB    COVID-19 Screening (If Applicable): No results found for: COVID19        Anesthesia Evaluation  Patient summary reviewed  Airway: Mallampati: II  TM distance: >3 FB   Neck ROM: full  Mouth opening: > = 3 FB Dental:          Pulmonary:Negative Pulmonary ROS                              Cardiovascular:    (+) hypertension:, hyperlipidemia        Rhythm: regular  Rate: normal           Beta Blocker:  Not on Beta Blocker         Neuro/Psych:   Negative Neuro/Psych ROS              GI/Hepatic/Renal:   (+) morbid obesity          Endo/Other: Negative Endo/Other ROS                    Abdominal:       Abdomen: soft. Vascular: Other Findings:             Anesthesia Plan      MAC     ASA 2       Induction: intravenous. Anesthetic plan and risks discussed with patient. Plan discussed with CRNA. Pt seen questions answered plan outlined H&P reviewed no interim changes accepts MAC. Priyank Gutierrez M.D 09/24/2021 6950.       Genesis Kaye MD   9/24/2021

## 2021-09-28 ENCOUNTER — OFFICE VISIT (OUTPATIENT)
Dept: PODIATRY | Age: 56
End: 2021-09-28

## 2021-09-28 VITALS — WEIGHT: 209 LBS | TEMPERATURE: 98.3 F | BODY MASS INDEX: 34.78 KG/M2

## 2021-09-28 DIAGNOSIS — Z09 POSTOPERATIVE EXAMINATION: ICD-10-CM

## 2021-09-28 DIAGNOSIS — G89.18 POST-OP PAIN: Primary | ICD-10-CM

## 2021-09-28 PROCEDURE — 99024 POSTOP FOLLOW-UP VISIT: CPT | Performed by: PODIATRIST

## 2021-10-08 ENCOUNTER — OFFICE VISIT (OUTPATIENT)
Dept: PODIATRY | Age: 56
End: 2021-10-08

## 2021-10-08 VITALS — BODY MASS INDEX: 34.78 KG/M2 | WEIGHT: 209 LBS | TEMPERATURE: 98.2 F

## 2021-10-08 DIAGNOSIS — Z09 POSTOPERATIVE EXAMINATION: Primary | ICD-10-CM

## 2021-10-08 PROCEDURE — 99024 POSTOP FOLLOW-UP VISIT: CPT | Performed by: PODIATRIST

## 2021-10-08 NOTE — PROGRESS NOTES
Postop Progress Note    Subjective    Marissa Ulloa presents to the office 2 weeks  following foot sx. Eating a regular diet without difficulty. Bowel movements are normal. Patient reports wound healing well. The patient is not having any pain. Objective    Vitals:    10/08/21 1156   Temp: 98.2 °F (36.8 °C)     General: alert, cooperative and no distress  Incision: healing well, no drainage, no erythema, no hernia, no seroma, no swelling, well approximated    Assessment    Doing well postoperatively. Plan   1. Continue any current medications  2. Wound care discussed  3. Wound/Incision: healing well  4. Disposition:   Limited activities. No heavy lifting. May return to work on tomorrow. 5. Diet: regular diet  6. Follow up: 2 week. Patient Name: Smitha Navarro     Suture/ Staple Removal Procedure Note  Indication: Wound healed incision is healed there is no signs of erythematous wound dehiscence or drainage    Procedure: The patient was placed in the appropriate position the wound was aseptically prepped with chlora prep   and the sutures were removed without difficulty. The site was  dressed  the area has healed with no complications      The patient tolerated the procedure well.     Complications: None          Electronically signed by Marifer Fritz DPM on 10/8/2021 at 12:20 PM

## 2021-10-21 ENCOUNTER — OFFICE VISIT (OUTPATIENT)
Dept: PODIATRY | Age: 56
End: 2021-10-21

## 2021-10-21 VITALS — WEIGHT: 209 LBS | BODY MASS INDEX: 34.82 KG/M2 | HEIGHT: 65 IN

## 2021-10-21 DIAGNOSIS — Z09 POSTOPERATIVE EXAMINATION: Primary | ICD-10-CM

## 2021-10-21 PROCEDURE — 99024 POSTOP FOLLOW-UP VISIT: CPT | Performed by: PODIATRIST

## 2021-10-21 RX ORDER — CEPHALEXIN 500 MG/1
500 CAPSULE ORAL 2 TIMES DAILY
Qty: 20 CAPSULE | Refills: 0 | Status: SHIPPED
Start: 2021-10-21 | End: 2021-11-11

## 2021-10-21 NOTE — PROGRESS NOTES
Postop Progress Note    Subjective    Marissa Velazquez presents to the office 4 weeks  following foot sx. Eating a regular diet without difficulty. Bowel movements are normal. Patient reports wound healing well. The patient is not having any pain. Objective    There were no vitals filed for this visit. General: alert, cooperative and no distress  Incision: healing well, no drainage, no erythema, no hernia, no seroma, no swelling, well approximated  XR FOOT RIGHT (MIN 3 VIEWS)    Result Date: 10/9/2021  EXAMINATION: THREE XRAY VIEWS OF THE RIGHT FOOT 10/8/2021 11:57 am COMPARISON: 09/28/2021 HISTORY: ORDERING SYSTEM PROVIDED HISTORY: Post-op pain TECHNOLOGIST PROVIDED HISTORY: Reason for exam:->non wb FINDINGS: No significant change. Surgical arthrodesis with metallic plate and surgical screw fixation of the 2nd MTP joint. Fixation hardware appears intact. Stable bony position and alignment. Remote hallux valgus corrective surgery with a healed osteotomy and surgical screw and pin fixation of the distal 1st metatarsal.  Stable metallic staple in place at the base of the proximal phalanx, right great toe. No dislocation or acute fracture. An os trigonum is present and there is a stable corticated bony density at the medial margin of the tarsal navicular. An additional corticated bony density is present at the tip of the medial malleolus. Osteoarthritis with joint space narrowing and marginal spurring of the midfoot. Small to moderately large plantar calcaneal spur. Mild flatfoot deformity. 1.  Surgical arthrodesis of the right 2nd MTP joint. Intact fixation hardware and stable postoperative change. 2.  Hallux valgus corrective surgery and additional chronic changes including flatfoot deformity and right midfoot osteoarthritis.      XR FOOT RIGHT (MIN 3 VIEWS)    Result Date: 9/28/2021  EXAMINATION: THREE XRAY VIEWS OF THE RIGHT FOOT 9/28/2021 10:10 am COMPARISON: Right foot x-ray 07/07/2021 HISTORY: ORDERING SYSTEM PROVIDED HISTORY: Post-op pain TECHNOLOGIST PROVIDED HISTORY: Reason for exam:->non wb FINDINGS: Postsurgical morphology of the 1st proximal phalangeal base and 1st metatarsal head are unchanged with intact fixation hardware. There is interval placement of plate and screw arthrodesis hardware bridging the 2nd metatarsophalangeal joint; the hardware appears intact and in appropriate position. There is moderate-marked calcaneal spurring at the plantar insertion. There is mild dorsal spurring of the midfoot, mild periarticular spurring of the tibiotalar joint and minimal calcaneal spurring at the Achilles insertion. There are well corticated, chronic appearing ossific densities adjacent to the medial malleolus and medial navicular bone. There is evidence of soft tissue swelling. 1. Interval 2nd metatarsophalangeal arthrodesis with intact internal fixation hardware. 2. Postsurgical changes of the 1st metatarsal head and 1st proximal phalangeal base are unchanged with intact fixation hardware. FLUORO FOR SURGICAL PROCEDURES    Result Date: 9/24/2021  EXAMINATION: SPOT FLUOROSCOPIC IMAGES 9/24/2021 10:09 am TECHNIQUE: Fluoroscopy was provided by the radiology department for procedure. Radiologist was not present during examination. FLUOROSCOPY DOSE AND TYPE OR TIME AND EXPOSURES: Fluoroscopy time equals 1.36 minutes. Total dose equals 1.46 mGy COMPARISON: None HISTORY: ORDERING SYSTEM PROVIDED HISTORY: arthrodesis second mpj right foot TECHNOLOGIST PROVIDED HISTORY: Reason for exam:->arthrodesis second mpj right foot Intraprocedural imaging. FINDINGS: 2 spot images of the foot were obtained. Intraprocedural fluoroscopic spot images as above. See separate procedure report for more information. Assessment    Doing well postoperatively. Plan   1. Continue any current medications  2. Wound care discussed  3. Wound/Incision: healing well  4. Disposition:   Limited activities.   No heavy lifting. May return to work on tomorrow. 5. Diet: regular diet  6. Follow up: 2 week.            Electronically signed by Erik Kwong DPM on 10/21/2021 at 12:52 PM

## 2021-11-11 ENCOUNTER — TELEPHONE (OUTPATIENT)
Dept: PODIATRY | Age: 56
End: 2021-11-11

## 2021-11-11 ENCOUNTER — OFFICE VISIT (OUTPATIENT)
Dept: PODIATRY | Age: 56
End: 2021-11-11
Payer: COMMERCIAL

## 2021-11-11 ENCOUNTER — PREP FOR PROCEDURE (OUTPATIENT)
Dept: FAMILY MEDICINE CLINIC | Age: 56
End: 2021-11-11

## 2021-11-11 VITALS — BODY MASS INDEX: 34.78 KG/M2 | TEMPERATURE: 97.4 F | WEIGHT: 209 LBS

## 2021-11-11 DIAGNOSIS — G89.18 POST-OP PAIN: Primary | ICD-10-CM

## 2021-11-11 DIAGNOSIS — S92.324A CLOSED NONDISPLACED FRACTURE OF SECOND METATARSAL BONE OF RIGHT FOOT, INITIAL ENCOUNTER: ICD-10-CM

## 2021-11-11 PROCEDURE — 99213 OFFICE O/P EST LOW 20 MIN: CPT | Performed by: PODIATRIST

## 2021-11-11 PROCEDURE — 1036F TOBACCO NON-USER: CPT | Performed by: PODIATRIST

## 2021-11-11 PROCEDURE — 3017F COLORECTAL CA SCREEN DOC REV: CPT | Performed by: PODIATRIST

## 2021-11-11 PROCEDURE — G8417 CALC BMI ABV UP PARAM F/U: HCPCS | Performed by: PODIATRIST

## 2021-11-11 PROCEDURE — G8427 DOCREV CUR MEDS BY ELIG CLIN: HCPCS | Performed by: PODIATRIST

## 2021-11-11 PROCEDURE — G9899 SCRN MAM PERF RSLTS DOC: HCPCS | Performed by: PODIATRIST

## 2021-11-11 PROCEDURE — G8484 FLU IMMUNIZE NO ADMIN: HCPCS | Performed by: PODIATRIST

## 2021-11-11 RX ORDER — SODIUM CHLORIDE 0.9 % (FLUSH) 0.9 %
5-40 SYRINGE (ML) INJECTION EVERY 12 HOURS SCHEDULED
Status: CANCELLED | OUTPATIENT
Start: 2021-11-11

## 2021-11-11 RX ORDER — SODIUM CHLORIDE 0.9 % (FLUSH) 0.9 %
5-40 SYRINGE (ML) INJECTION PRN
Status: CANCELLED | OUTPATIENT
Start: 2021-11-11

## 2021-11-11 RX ORDER — SODIUM CHLORIDE 9 MG/ML
25 INJECTION, SOLUTION INTRAVENOUS PRN
Status: CANCELLED | OUTPATIENT
Start: 2021-11-11

## 2021-11-11 NOTE — PATIENT INSTRUCTIONS
Sx is scheduled for 11/26/21 at [facility] in [address]   They will contact you to give you all your preop instructions  Do not eat or drink anything after midnight on 11/25/21 nothing morning of surgery  Call Julius Johnson if any issues [phone]

## 2021-11-18 LAB — MAMMOGRAPHY, EXTERNAL: NORMAL

## 2021-11-22 ENCOUNTER — OFFICE VISIT (OUTPATIENT)
Dept: FAMILY MEDICINE CLINIC | Age: 56
End: 2021-11-22
Payer: COMMERCIAL

## 2021-11-22 VITALS
SYSTOLIC BLOOD PRESSURE: 128 MMHG | TEMPERATURE: 97.8 F | WEIGHT: 210 LBS | DIASTOLIC BLOOD PRESSURE: 80 MMHG | HEART RATE: 88 BPM | BODY MASS INDEX: 34.99 KG/M2 | HEIGHT: 65 IN | OXYGEN SATURATION: 98 %

## 2021-11-22 DIAGNOSIS — Z01.818 PRE-OP EVALUATION: Primary | ICD-10-CM

## 2021-11-22 PROCEDURE — 3017F COLORECTAL CA SCREEN DOC REV: CPT | Performed by: FAMILY MEDICINE

## 2021-11-22 PROCEDURE — 99213 OFFICE O/P EST LOW 20 MIN: CPT | Performed by: FAMILY MEDICINE

## 2021-11-22 PROCEDURE — 93000 ELECTROCARDIOGRAM COMPLETE: CPT | Performed by: FAMILY MEDICINE

## 2021-11-22 PROCEDURE — G8484 FLU IMMUNIZE NO ADMIN: HCPCS | Performed by: FAMILY MEDICINE

## 2021-11-22 PROCEDURE — G8427 DOCREV CUR MEDS BY ELIG CLIN: HCPCS | Performed by: FAMILY MEDICINE

## 2021-11-22 PROCEDURE — 1036F TOBACCO NON-USER: CPT | Performed by: FAMILY MEDICINE

## 2021-11-22 PROCEDURE — G8417 CALC BMI ABV UP PARAM F/U: HCPCS | Performed by: FAMILY MEDICINE

## 2021-11-22 PROCEDURE — G9899 SCRN MAM PERF RSLTS DOC: HCPCS | Performed by: FAMILY MEDICINE

## 2021-11-22 RX ORDER — GABAPENTIN 300 MG/1
CAPSULE ORAL
COMMUNITY
Start: 2021-09-21 | End: 2021-11-22 | Stop reason: ALTCHOICE

## 2021-11-22 NOTE — PROGRESS NOTES

## 2021-11-23 NOTE — PROGRESS NOTES
Patient stated that she has been exposed to someone who was positive for covid. Instructed to contact Dr. Matilda Tiwari' office if any symptoms prior to surgery.

## 2021-11-23 NOTE — PROGRESS NOTES
[facility] PRE-ADMISSION TESTING INSTRUCTIONS    The Preadmission Testing patient is instructed accordingly using the following criteria (check applicable):    ARRIVAL INSTRUCTIONS:  [x] Parking the day of Surgery is located in the Main Entrance lot. Upon entering the door, make an immediate right to the surgery reception desk    [x] Bring photo ID and insurance card    [] Bring in a copy of Living will or Durable Power of  papers. [x] Please be sure to arrange for responsible adult to provide transportation to and from the hospital    [x] Please arrange for responsible adult to be with you for the 24 hour period post procedure due to having anesthesia      GENERAL INSTRUCTIONS:    [x] Nothing by mouth after midnight, including gum, candy, mints or water    [x] You may brush your teeth, but do not swallow any water    [] Take medications as instructed with 1-2 oz of water    [] Stop herbal supplements and vitamins 5 days prior to procedure    [] Follow preop dosing of blood thinners per physician instructions    [] Take 1/2 dose of evening insulin, but no insulin after midnight    [] No oral diabetic medications after midnight    [] If diabetic and have low blood sugar or feel symptomatic, take 1-2oz apple juice only    [] Bring inhalers day of surgery    [] Bring C-PAP/ Bi-Pap day of surgery    [] Bring urine specimen day of surgery    [x] Shower or bath with soap, lather and rinse well, AM of Surgery, no lotion, powders or creams to surgical site    [] Follow bowel prep as instructed per surgeon    [x] No tobacco products within 24 hours of surgery     [x] No alcohol or illegal drug use within 24 hours of surgery.     [x] Jewelry, body piercing's, eyeglasses, contact lenses and dentures are not permitted into surgery (bring cases)      [x] Please do not wear any nail polish, make up or hair products on the day of surgery    [x] You can expect a call the business day prior to procedure to notify you if your arrival time changes    [x] If you receive a survey after surgery we would greatly appreciate your comments    [] Parent/guardian of a minor must accompany their child and remain on the premises  the entire time they are under our care     [] Pediatric patients may bring favorite toy, blanket or comfort item with them    [] A caregiver or family member must remain with the patient during their stay if they are mentally handicapped, have dementia, disoriented or unable to use a call light or would be a safety concern if left unattended    [x] Please notify surgeon if you develop any illness between now and time of surgery (cold, cough, sore throat, fever, nausea, vomiting) or any signs of infections  including skin, wounds, and dental.    [x]  The Outpatient Pharmacy is available to fill your prescription here on your day of surgery, ask your preop nurse for details    [] Other instructions    EDUCATIONAL MATERIALS PROVIDED:    [] PAT Preoperative Education Packet/Booklet     [] Medication List    [] Transfusion bracelet applied with instructions    [] Shower with soap, lather and rinse well, and use CHG wipes provided the evening before surgery as instructed    [] Incentive spirometer with instructions

## 2021-11-26 ENCOUNTER — HOSPITAL ENCOUNTER (OUTPATIENT)
Age: 56
Setting detail: OUTPATIENT SURGERY
Discharge: HOME | End: 2021-11-26
Attending: PODIATRIST | Admitting: PODIATRIST
Payer: COMMERCIAL

## 2021-11-26 ENCOUNTER — ANESTHESIA EVENT (OUTPATIENT)
Dept: OPERATING ROOM | Age: 56
End: 2021-11-26
Payer: COMMERCIAL

## 2021-11-26 ENCOUNTER — APPOINTMENT (OUTPATIENT)
Dept: GENERAL RADIOLOGY | Age: 56
End: 2021-11-26
Attending: PODIATRIST
Payer: COMMERCIAL

## 2021-11-26 ENCOUNTER — ANESTHESIA (OUTPATIENT)
Dept: OPERATING ROOM | Age: 56
End: 2021-11-26
Payer: COMMERCIAL

## 2021-11-26 VITALS
OXYGEN SATURATION: 99 % | WEIGHT: 210 LBS | RESPIRATION RATE: 18 BRPM | HEIGHT: 65 IN | TEMPERATURE: 96.8 F | BODY MASS INDEX: 34.99 KG/M2 | SYSTOLIC BLOOD PRESSURE: 120 MMHG | HEART RATE: 64 BPM | DIASTOLIC BLOOD PRESSURE: 67 MMHG

## 2021-11-26 VITALS
RESPIRATION RATE: 2 BRPM | TEMPERATURE: 95.9 F | OXYGEN SATURATION: 100 % | DIASTOLIC BLOOD PRESSURE: 69 MMHG | SYSTOLIC BLOOD PRESSURE: 137 MMHG

## 2021-11-26 DIAGNOSIS — M20.42 HAMMERTOE OF LEFT FOOT: Primary | ICD-10-CM

## 2021-11-26 DIAGNOSIS — G89.18 POST-OP PAIN: Primary | ICD-10-CM

## 2021-11-26 PROCEDURE — 7100000000 HC PACU RECOVERY - FIRST 15 MIN: Performed by: PODIATRIST

## 2021-11-26 PROCEDURE — 3700000000 HC ANESTHESIA ATTENDED CARE: Performed by: PODIATRIST

## 2021-11-26 PROCEDURE — 28899 UNLISTED PX FOOT/TOES: CPT | Performed by: PODIATRIST

## 2021-11-26 PROCEDURE — 7100000010 HC PHASE II RECOVERY - FIRST 15 MIN: Performed by: PODIATRIST

## 2021-11-26 PROCEDURE — 2500000003 HC RX 250 WO HCPCS: Performed by: NURSE ANESTHETIST, CERTIFIED REGISTERED

## 2021-11-26 PROCEDURE — 6360000002 HC RX W HCPCS: Performed by: PODIATRIST

## 2021-11-26 PROCEDURE — 2500000003 HC RX 250 WO HCPCS: Performed by: PODIATRIST

## 2021-11-26 PROCEDURE — C1713 ANCHOR/SCREW BN/BN,TIS/BN: HCPCS | Performed by: PODIATRIST

## 2021-11-26 PROCEDURE — 3600000012 HC SURGERY LEVEL 2 ADDTL 15MIN: Performed by: PODIATRIST

## 2021-11-26 PROCEDURE — 2580000003 HC RX 258: Performed by: NURSE ANESTHETIST, CERTIFIED REGISTERED

## 2021-11-26 PROCEDURE — 3209999900 FLUORO FOR SURGICAL PROCEDURES

## 2021-11-26 PROCEDURE — 3700000001 HC ADD 15 MINUTES (ANESTHESIA): Performed by: PODIATRIST

## 2021-11-26 PROCEDURE — 28485 OPTX METATARSAL FX EACH: CPT | Performed by: PODIATRIST

## 2021-11-26 PROCEDURE — C1762 CONN TISS, HUMAN(INC FASCIA): HCPCS | Performed by: PODIATRIST

## 2021-11-26 PROCEDURE — 3600000002 HC SURGERY LEVEL 2 BASE: Performed by: PODIATRIST

## 2021-11-26 PROCEDURE — 2709999900 HC NON-CHARGEABLE SUPPLY: Performed by: PODIATRIST

## 2021-11-26 PROCEDURE — 7100000001 HC PACU RECOVERY - ADDTL 15 MIN: Performed by: PODIATRIST

## 2021-11-26 PROCEDURE — 6360000002 HC RX W HCPCS: Performed by: NURSE ANESTHETIST, CERTIFIED REGISTERED

## 2021-11-26 PROCEDURE — 2720000010 HC SURG SUPPLY STERILE: Performed by: PODIATRIST

## 2021-11-26 PROCEDURE — 7100000011 HC PHASE II RECOVERY - ADDTL 15 MIN: Performed by: PODIATRIST

## 2021-11-26 DEVICE — GRAFT BONE 2.5CC HCT/P 361 REGULATED VIABLE CELLULAR: Type: IMPLANTABLE DEVICE | Status: FUNCTIONAL

## 2021-11-26 DEVICE — IMPLANTABLE DEVICE: Type: IMPLANTABLE DEVICE | Site: FOOT | Status: FUNCTIONAL

## 2021-11-26 RX ORDER — LIDOCAINE HYDROCHLORIDE 20 MG/ML
INJECTION, SOLUTION EPIDURAL; INFILTRATION; INTRACAUDAL; PERINEURAL PRN
Status: DISCONTINUED | OUTPATIENT
Start: 2021-11-26 | End: 2021-11-26 | Stop reason: SDUPTHER

## 2021-11-26 RX ORDER — DEXAMETHASONE SODIUM PHOSPHATE 4 MG/ML
INJECTION, SOLUTION INTRA-ARTICULAR; INTRALESIONAL; INTRAMUSCULAR; INTRAVENOUS; SOFT TISSUE PRN
Status: DISCONTINUED | OUTPATIENT
Start: 2021-11-26 | End: 2021-11-26 | Stop reason: SDUPTHER

## 2021-11-26 RX ORDER — FENTANYL CITRATE 50 UG/ML
25 INJECTION, SOLUTION INTRAMUSCULAR; INTRAVENOUS EVERY 5 MIN PRN
Status: DISCONTINUED | OUTPATIENT
Start: 2021-11-26 | End: 2021-11-26 | Stop reason: HOSPADM

## 2021-11-26 RX ORDER — SODIUM CHLORIDE 9 MG/ML
INJECTION, SOLUTION INTRAVENOUS CONTINUOUS PRN
Status: DISCONTINUED | OUTPATIENT
Start: 2021-11-26 | End: 2021-11-26 | Stop reason: SDUPTHER

## 2021-11-26 RX ORDER — PROPOFOL 10 MG/ML
INJECTION, EMULSION INTRAVENOUS PRN
Status: DISCONTINUED | OUTPATIENT
Start: 2021-11-26 | End: 2021-11-26 | Stop reason: SDUPTHER

## 2021-11-26 RX ORDER — PHENYLEPHRINE HYDROCHLORIDE 10 MG/ML
INJECTION INTRAVENOUS PRN
Status: DISCONTINUED | OUTPATIENT
Start: 2021-11-26 | End: 2021-11-26 | Stop reason: SDUPTHER

## 2021-11-26 RX ORDER — MIDAZOLAM HYDROCHLORIDE 1 MG/ML
INJECTION INTRAMUSCULAR; INTRAVENOUS PRN
Status: DISCONTINUED | OUTPATIENT
Start: 2021-11-26 | End: 2021-11-26 | Stop reason: SDUPTHER

## 2021-11-26 RX ORDER — OXYCODONE HYDROCHLORIDE AND ACETAMINOPHEN 5; 325 MG/1; MG/1
1 TABLET ORAL EVERY 6 HOURS PRN
Qty: 20 TABLET | Refills: 0 | Status: SHIPPED | OUTPATIENT
Start: 2021-11-26 | End: 2021-12-01

## 2021-11-26 RX ORDER — SODIUM CHLORIDE 9 MG/ML
25 INJECTION, SOLUTION INTRAVENOUS PRN
Status: DISCONTINUED | OUTPATIENT
Start: 2021-11-26 | End: 2021-11-26 | Stop reason: HOSPADM

## 2021-11-26 RX ORDER — ONDANSETRON 2 MG/ML
4 INJECTION INTRAMUSCULAR; INTRAVENOUS
Status: DISCONTINUED | OUTPATIENT
Start: 2021-11-26 | End: 2021-11-26 | Stop reason: HOSPADM

## 2021-11-26 RX ORDER — SODIUM CHLORIDE 0.9 % (FLUSH) 0.9 %
5-40 SYRINGE (ML) INJECTION EVERY 12 HOURS SCHEDULED
Status: DISCONTINUED | OUTPATIENT
Start: 2021-11-26 | End: 2021-11-26 | Stop reason: HOSPADM

## 2021-11-26 RX ORDER — FENTANYL CITRATE 50 UG/ML
INJECTION, SOLUTION INTRAMUSCULAR; INTRAVENOUS PRN
Status: DISCONTINUED | OUTPATIENT
Start: 2021-11-26 | End: 2021-11-26 | Stop reason: SDUPTHER

## 2021-11-26 RX ORDER — MEPERIDINE HYDROCHLORIDE 25 MG/ML
25 INJECTION INTRAMUSCULAR; INTRAVENOUS; SUBCUTANEOUS EVERY 5 MIN PRN
Status: DISCONTINUED | OUTPATIENT
Start: 2021-11-26 | End: 2021-11-26 | Stop reason: HOSPADM

## 2021-11-26 RX ORDER — BUPIVACAINE HYDROCHLORIDE 5 MG/ML
INJECTION, SOLUTION EPIDURAL; INTRACAUDAL PRN
Status: DISCONTINUED | OUTPATIENT
Start: 2021-11-26 | End: 2021-11-26 | Stop reason: ALTCHOICE

## 2021-11-26 RX ORDER — FENTANYL CITRATE 50 UG/ML
50 INJECTION, SOLUTION INTRAMUSCULAR; INTRAVENOUS EVERY 5 MIN PRN
Status: DISCONTINUED | OUTPATIENT
Start: 2021-11-26 | End: 2021-11-26 | Stop reason: HOSPADM

## 2021-11-26 RX ORDER — SODIUM CHLORIDE 0.9 % (FLUSH) 0.9 %
5-40 SYRINGE (ML) INJECTION PRN
Status: DISCONTINUED | OUTPATIENT
Start: 2021-11-26 | End: 2021-11-26 | Stop reason: HOSPADM

## 2021-11-26 RX ORDER — ONDANSETRON 2 MG/ML
INJECTION INTRAMUSCULAR; INTRAVENOUS PRN
Status: DISCONTINUED | OUTPATIENT
Start: 2021-11-26 | End: 2021-11-26 | Stop reason: SDUPTHER

## 2021-11-26 RX ORDER — ROCURONIUM BROMIDE 10 MG/ML
INJECTION, SOLUTION INTRAVENOUS PRN
Status: DISCONTINUED | OUTPATIENT
Start: 2021-11-26 | End: 2021-11-26 | Stop reason: SDUPTHER

## 2021-11-26 RX ADMIN — DEXAMETHASONE SODIUM PHOSPHATE 10 MG: 4 INJECTION, SOLUTION INTRAMUSCULAR; INTRAVENOUS at 09:08

## 2021-11-26 RX ADMIN — SODIUM CHLORIDE: 9 INJECTION, SOLUTION INTRAVENOUS at 08:32

## 2021-11-26 RX ADMIN — ROCURONIUM BROMIDE 30 MG: 10 INJECTION, SOLUTION INTRAVENOUS at 09:00

## 2021-11-26 RX ADMIN — SODIUM CHLORIDE: 9 INJECTION, SOLUTION INTRAVENOUS at 10:37

## 2021-11-26 RX ADMIN — PROPOFOL 200 MG: 10 INJECTION, EMULSION INTRAVENOUS at 09:00

## 2021-11-26 RX ADMIN — FENTANYL CITRATE 50 MCG: 50 INJECTION, SOLUTION INTRAMUSCULAR; INTRAVENOUS at 09:52

## 2021-11-26 RX ADMIN — PHENYLEPHRINE HYDROCHLORIDE 50 MCG: 10 INJECTION INTRAVENOUS at 09:32

## 2021-11-26 RX ADMIN — LIDOCAINE HYDROCHLORIDE 40 MG: 20 INJECTION, SOLUTION EPIDURAL; INFILTRATION; INTRACAUDAL; PERINEURAL at 09:00

## 2021-11-26 RX ADMIN — Medication 2000 MG: at 08:55

## 2021-11-26 RX ADMIN — FENTANYL CITRATE 50 MCG: 50 INJECTION, SOLUTION INTRAMUSCULAR; INTRAVENOUS at 09:00

## 2021-11-26 RX ADMIN — SODIUM CHLORIDE: 9 INJECTION, SOLUTION INTRAVENOUS at 08:58

## 2021-11-26 RX ADMIN — MIDAZOLAM 2 MG: 1 INJECTION INTRAMUSCULAR; INTRAVENOUS at 08:55

## 2021-11-26 RX ADMIN — ONDANSETRON 4 MG: 2 INJECTION INTRAMUSCULAR; INTRAVENOUS at 09:19

## 2021-11-26 ASSESSMENT — PULMONARY FUNCTION TESTS
PIF_VALUE: 20
PIF_VALUE: 19
PIF_VALUE: 22
PIF_VALUE: 18
PIF_VALUE: 20
PIF_VALUE: 19
PIF_VALUE: 20
PIF_VALUE: 3
PIF_VALUE: 20
PIF_VALUE: 3
PIF_VALUE: 19
PIF_VALUE: 19
PIF_VALUE: 18
PIF_VALUE: 19
PIF_VALUE: 20
PIF_VALUE: 18
PIF_VALUE: 15
PIF_VALUE: 14
PIF_VALUE: 2
PIF_VALUE: 20
PIF_VALUE: 19
PIF_VALUE: 3
PIF_VALUE: 2
PIF_VALUE: 15
PIF_VALUE: 15
PIF_VALUE: 3
PIF_VALUE: 3
PIF_VALUE: 18
PIF_VALUE: 15
PIF_VALUE: 0
PIF_VALUE: 8
PIF_VALUE: 21
PIF_VALUE: 3
PIF_VALUE: 18
PIF_VALUE: 3
PIF_VALUE: 3
PIF_VALUE: 20
PIF_VALUE: 18
PIF_VALUE: 20
PIF_VALUE: 15
PIF_VALUE: 19
PIF_VALUE: 19
PIF_VALUE: 15
PIF_VALUE: 19
PIF_VALUE: 20
PIF_VALUE: 18
PIF_VALUE: 6
PIF_VALUE: 19
PIF_VALUE: 3
PIF_VALUE: 20
PIF_VALUE: 18
PIF_VALUE: 2
PIF_VALUE: 3
PIF_VALUE: 19
PIF_VALUE: 0
PIF_VALUE: 19
PIF_VALUE: 15
PIF_VALUE: 10
PIF_VALUE: 2
PIF_VALUE: 19
PIF_VALUE: 15
PIF_VALUE: 2
PIF_VALUE: 20
PIF_VALUE: 18
PIF_VALUE: 15
PIF_VALUE: 22
PIF_VALUE: 3
PIF_VALUE: 1
PIF_VALUE: 2
PIF_VALUE: 15
PIF_VALUE: 1
PIF_VALUE: 19
PIF_VALUE: 4
PIF_VALUE: 19
PIF_VALUE: 20
PIF_VALUE: 15
PIF_VALUE: 15
PIF_VALUE: 1
PIF_VALUE: 1
PIF_VALUE: 3
PIF_VALUE: 10
PIF_VALUE: 19
PIF_VALUE: 20
PIF_VALUE: 15
PIF_VALUE: 20
PIF_VALUE: 3
PIF_VALUE: 17
PIF_VALUE: 6
PIF_VALUE: 18
PIF_VALUE: 3
PIF_VALUE: 15
PIF_VALUE: 5
PIF_VALUE: 20
PIF_VALUE: 15
PIF_VALUE: 20
PIF_VALUE: 19
PIF_VALUE: 3
PIF_VALUE: 3
PIF_VALUE: 19
PIF_VALUE: 3
PIF_VALUE: 19
PIF_VALUE: 19
PIF_VALUE: 15
PIF_VALUE: 20
PIF_VALUE: 19
PIF_VALUE: 3
PIF_VALUE: 19
PIF_VALUE: 18
PIF_VALUE: 2
PIF_VALUE: 18
PIF_VALUE: 20
PIF_VALUE: 18

## 2021-11-26 ASSESSMENT — PAIN - FUNCTIONAL ASSESSMENT: PAIN_FUNCTIONAL_ASSESSMENT: 0-10

## 2021-11-26 ASSESSMENT — PAIN SCALES - GENERAL: PAINLEVEL_OUTOF10: 0

## 2021-11-26 NOTE — ANESTHESIA POSTPROCEDURE EVALUATION
Department of Anesthesiology  Postprocedure Note    Patient: Ortiz Narayan  MRN: [id number]  YOB: 1965  Date of evaluation: 11/26/2021  Time:  11:03 AM     Procedure Summary     Date: 11/26/21 Room / Location: [address]    Anesthesia Start: 8816 Anesthesia Stop:     Procedure: OPEN REDUCTION INTERNAL FIXATION WITH PLATE SECOND METATARSAL FRACTURE RIGHT FOOT (Right Foot) Diagnosis: (FRACTURE SECOND METATARSAL RIGHT FOOT)    Surgeons: Erik Kwong DPM Responsible Provider: Kaden Giron MD    Anesthesia Type: general ASA Status: 2          Anesthesia Type: No value filed. Lizbeth Phase I: Lizbeth Score: 10    Lizbeth Phase II:      Last vitals: Reviewed and per EMR flowsheets.        Anesthesia Post Evaluation    Patient location during evaluation: PACU  Patient participation: complete - patient participated  Level of consciousness: awake  Pain score: 3  Airway patency: patent  Nausea & Vomiting: no nausea  Complications: no  Cardiovascular status: blood pressure returned to baseline  Respiratory status: acceptable  Hydration status: euvolemic

## 2021-11-26 NOTE — OP NOTE
Operative Note      Patient: Gloria Manjarrez  YOB: 1965  MRN: [id number]    Date of Procedure: 11/26/2021    Pre-Op Diagnosis: FRACTURE SECOND METATARSAL RIGHT FOOT    Post-Op Diagnosis: Same       Procedure(s):  OPEN REDUCTION INTERNAL FIXATION WITH PLATE SECOND METATARSAL FRACTURE RIGHT FOOT    Surgeon(s):  Rigo Villar DPM    Assistant: Niyah Mendez  * No surgical staff found *    Anesthesia: General    Estimated Blood Loss (mL): 10 mL    Complications: None    Specimens: None  * No specimens in log *    Implants:  Implant Name Type Inv. Item Serial No.  Lot No. LRB No. Used Action   GRAFT BONE 2.5CC HCT/P 361 REGULATED VIABLE CELLULAR  GRAFT BONE 2.5CC HCT/P 361 REGULATED VIABLE CELLULAR  [employer] [id number] Right 1 Implanted   BB ALEXIA    [employer]  Right 2 Implanted   2 x 9 mm LOCK SCREW    [employer]  Right 1 Implanted   2 X 12 LOCK SCREW    [employer]  Right 2 Implanted   2 X 13 CORTEX SCREW    [employer]  Right 1 Implanted   T PLATE REINFORCED    [employer]  Right 1 Implanted   2 X 10 LOCKING SCREW    [employer]PMM  Right 1 Implanted   SCREW BNE LP 2X18 MM CORTICAL  SCREW BNE LP 2X18 MM CORTICAL  [employer]WD  Right 1 Implanted         Drains: None    Findings: fracture of 2nd metatarsal     Detailed Description of Procedure: Following satisfactory pre-op evaluation the patient was brought into the OR and placed on the OR table in the supine position. General sedation was administered by anesthesia. Following sedation a time out was then called identifying patient identity, procedure, operative location, birth date, allergies, antibiotics and other pertinent information in regards to the surgery to which everyone agreed. The foot was then prepped and draped in the usual sterile manner and lowered onto the surgical field. An ankle tourniquet was then inflated to 250 mmHg.      Procedure #1 hardware removal:  Attention was then directed to the dorsal aspect of the right foot. Using a #15 blade, a full thickness skin incision was made over the dorsal aspect of the right metatarsal. The incision was deepened through the subcutaneous tissues using a combination of sharp and blunt dissection with care being taken to identify and retract all vital neurovascular structures. All bleeders were electrocauterized as encountered. At this time, the tendons of the extensor digitorum brevis were identified and bluntly dissected through until the underlying muscle belly was encountered. This muscle belly too was dissected through to the level of deep fascia using blunt dissection. Next, the 2nd metatarsal was manually palpated to insure that the interspace was being avoided. Using a #15 blade, a deep fascial/capsular incision was made over the 2nd metatarsal. The capsular tissue was then reflected using a key elevator and sharp dissection and the fracture line was identified. At this point the existing plate and screw fixation was disassembled and removed from the patients foot. Procedure #2 ORIF of 2nd metatarsal:  After locating the fracture site, a freer elevator and was used to locate and mobilize and reduce the fracture back to anatomic alignment. This position was held in place using a k wire. Next, live intraoperative fluoroscopy was used to visualize the reduction. This reduction was noted to be excellent. Next, a locking plate was placed over the fracture site on the metatarsal.  Proper position of the plate was confirmed using live intraoperative fluoroscopy. After determining that the plate was in proper position, using modified AO technique and the manufacturers technique recommendations, the plate was permanently applied to the metatarsal over the fracture site. A combination of locking and non-locking screws were needed. Excellent compression was noted at the fracture site via direct visualization.  Live fluoroscopy was used to confirm the position of the plate, and again excellent position and compression was noted. The previous fusion site at the 2nd MPJ was noted to be non fused. Both the head of the second metatarsal and base of proximal phalanx were debrided of all interposing tissue so both surfaces consisted of only bone. A k wire was used to fenestrated the joint surfaces. The joint was then filled with arthrex bone graft. At this time, the wound was irrigated with copious amounts of normal saline. The tourniquet was deflated and all bleeders were identified and electrocauterized. The deep fascial layer was closed using 2-0 vicryl. The subcutaneous tissues were closed using 3-0 vicryl. The skin was closed using a 3-0 stratafix suture technique. The incision was then dressed in dry sterile dressing and a posterior splint was applied. The patient tolerated the anesthesia and procedure well.  Following a brief period of post op monitoring the patient will be released from the hospital.     Electronically signed by Tracie Hernandez DPM on 11/26/2021 at 11:03 AM

## 2021-11-26 NOTE — BRIEF OP NOTE
Brief Postoperative Note      Patient: Gabe Sparks  YOB: 1965  MRN: [id number]    Date of Procedure: 11/26/2021    Pre-Op Diagnosis: FRACTURE SECOND METATARSAL RIGHT FOOT    Post-Op Diagnosis: Same       Procedure(s):  OPEN REDUCTION INTERNAL FIXATION WITH PLATE SECOND METATARSAL FRACTURE RIGHT FOOT    Surgeon(s):  Vinh Gutierrez DPM    Assistant: Nataly Anton  * No surgical staff found *    Anesthesia: General    Estimated Blood Loss (mL): 10 mL    Complications: None    Specimens: None  * No specimens in log *    Implants:  Implant Name Type Inv.  Item Serial No.  Lot No. LRB No. Used Action   GRAFT BONE 2.5CC HCT/P 361 REGULATED VIABLE CELLULAR  GRAFT BONE 2.5CC HCT/P 361 REGULATED VIABLE CELLULAR  [employer] [id number] Right 1 Implanted   BB ALEXIA    [employer]  Right 2 Implanted   2 x 9 mm LOCK SCREW    [employer]  Right 1 Implanted   2 X 12 LOCK SCREW    [employer]  Right 2 Implanted   2 X 13 CORTEX SCREW    [employer]  Right 1 Implanted   T PLATE REINFORCED    [employer]  Right 1 Implanted   2 X 10 LOCKING SCREW    [employer]  Right 1 Implanted   SCREW BNE LP 2X18 MM CORTICAL  SCREW BNE LP 2X18 MM CORTICAL  [employer]  Right 1 Implanted         Drains: None    Findings: 2nd metatarsal fracture    Electronically signed by Richard Munoz DPM on 11/26/2021 at 11:14 AM

## 2021-11-26 NOTE — ANESTHESIA PRE PROCEDURE
Department of Anesthesiology  Preprocedure Note       Name:  Jr Claudio   Age:  61 y.o.  :  1965                                          MRN:  [id number]         Date:  2021      Surgeon: Marisol Kendall):  Francia Lucero DPM    Procedure: Procedure(s):  OPEN REDUCTION INTERNAL FIXATION WITH PLATE SECOND METATARSAL FRACTURE RIGHT FOOT  ++[employer]++    Medications prior to admission:   Prior to Admission medications    Medication Sig Start Date End Date Taking? Authorizing Provider   atorvastatin (LIPITOR) 20 MG tablet take 1 tablet by mouth once daily for cholesterol 21  Yes Marielle Plasencia MD   losartan-hydroCHLOROthiazide [facility]) 100-25 MG per tablet 1 tab daily 21  Yes Marielle Plasencia MD       Current medications:    Current Facility-Administered Medications   Medication Dose Route Frequency Provider Last Rate Last Admin    0.9 % sodium chloride infusion  25 mL IntraVENous PRN Francia Lucero DPM        ceFAZolin (ANCEF) 2000 mg in sterile water 20 mL IV syringe  2,000 mg IntraVENous On Call to Kinsey Bennett 272, DPM        sodium chloride flush 0.9 % injection 5-40 mL  5-40 mL IntraVENous 2 times per day Francia Lucero DPM        sodium chloride flush 0.9 % injection 5-40 mL  5-40 mL IntraVENous PRN Francia Lucero DPM        ceFAZolin 2000 mg in 20 mL SWFI IV Syringe IV syringe                Allergies:     Allergies   Allergen Reactions    Sulfa Antibiotics Hives, Shortness Of Breath and Palpitations       Problem List:    Patient Active Problem List   Diagnosis Code    Absence of menstruation N91.2    Essential hypertension I10    Mixed hyperlipidemia E78.2    Long term current use of diuretic Z79.899    Posterior tibial tendon dysfunction, right M76.821    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 M21.962    Hammertoe of left foot M20.42       Past Medical History:        Diagnosis Date    Hyperlipidemia     Hypertension     Prolonged emergence from general anesthesia        Past Surgical History:        Procedure Laterality Date    ARTHRODESIS Right 9/24/2021    ARTHRODESIS SECOND MPJ RIGHT FOOT ARTHRODESIS SECOND IPJ RIGHT FOOT performed by Kj Frank DPM at [address] Bilateral 03/25/2019    BREAST REDUCTION SURGERY Bilateral 3/25/2019    BILATERAL BREAST REDUCTION performed [facility] Keira [facility] MD at [facility]  2005    FOOT SURGERY Right     x2    LAPAROSCOPY  2007    right ovarian cyst    LAPAROSCOPY  2000    right tubal preg       Social History:    Social History     Tobacco Use    Smoking status: Never Smoker    Smokeless tobacco: Never Used   Substance Use Topics    Alcohol use: No                                Counseling given: Not Answered      Vital Signs (Current):   Vitals:    11/22/21 1558   Weight: 210 lb (95.3 kg)   Height: 5' 5\" (1.651 m)                                              BP Readings from Last 3 Encounters:   11/22/21 128/80   09/24/21 125/62   09/24/21 (!) 113/56       NPO Status:                                                                                 BMI:   Wt Readings from Last 3 Encounters:   11/22/21 210 lb (95.3 kg)   11/22/21 210 lb (95.3 kg)   11/11/21 209 lb (94.8 kg)     Body mass index is 34.95 kg/m².     CBC:   Lab Results   Component Value Date    WBC 6.9 12/15/2020    RBC 4.74 12/15/2020    HGB 14.5 12/15/2020    HCT 42.3 12/15/2020    MCV 89.2 12/15/2020    RDW 12.8 12/15/2020     12/15/2020       CMP:   Lab Results   Component Value Date     12/15/2020    K 3.7 12/15/2020     12/15/2020    CO2 28 12/15/2020    BUN 19 12/15/2020    CREATININE 0.7 12/15/2020    GFRAA >60 12/15/2020    LABGLOM >60 12/15/2020    GLUCOSE 108 12/15/2020    PROT 7.3 12/15/2020    CALCIUM 10.5 12/15/2020    BILITOT 0.5 12/15/2020    ALKPHOS 72 12/15/2020    AST 26 12/15/2020    ALT 31 12/15/2020       POC Tests: No results for input(s): POCGLU, POCNA, POCK, POCCL, POCBUN, POCHEMO, POCHCT in the last 72 hours. Coags: No results found for: PROTIME, INR, APTT    HCG (If Applicable): No results found for: PREGTESTUR, PREGSERUM, HCG, HCGQUANT     ABGs: No results found for: PHART, PO2ART, KGV1RBI, QVU1RMR, BEART, A8QBXRZA     Type & Screen (If Applicable):  No results found for: LABABO, LABRH    Drug/Infectious Status (If Applicable):  No results found for: HIV, HEPCAB    COVID-19 Screening (If Applicable): No results found for: COVID19        Anesthesia Evaluation  Patient summary reviewed  Airway: Mallampati: II  TM distance: >3 FB   Neck ROM: full  Mouth opening: > = 3 FB Dental: normal exam         Pulmonary:Negative Pulmonary ROS breath sounds clear to auscultation                             Cardiovascular:    (+) hypertension:,         Rhythm: regular  Rate: normal           Beta Blocker:  Not on Beta Blocker         Neuro/Psych:   Negative Neuro/Psych ROS              GI/Hepatic/Renal: Neg GI/Hepatic/Renal ROS            Endo/Other: Negative Endo/Other ROS                    Abdominal:   (+) obese,     Abdomen: soft. Vascular: Other Findings:             Anesthesia Plan      general     ASA 2       Induction: intravenous. Anesthetic plan and risks discussed with patient. Plan discussed with CRNA. Pt seen examined no interim changes H&P reviewed questions answered plan outlined accepts general anesthetic. Macy Chan M.D 11/26/2021 9306.       Marbella Milner MD   11/26/2021      Chart review agree above  CHAYO Colon - CRNA

## 2021-11-29 ENCOUNTER — OFFICE VISIT (OUTPATIENT)
Dept: PODIATRY | Age: 56
End: 2021-11-29
Payer: COMMERCIAL

## 2021-11-29 VITALS — TEMPERATURE: 97.2 F

## 2021-11-29 DIAGNOSIS — G89.18 POST-OP PAIN: Primary | ICD-10-CM

## 2021-11-29 DIAGNOSIS — Z09 POSTOPERATIVE EXAMINATION: ICD-10-CM

## 2021-11-29 DIAGNOSIS — S92.324A CLOSED NONDISPLACED FRACTURE OF SECOND METATARSAL BONE OF RIGHT FOOT, INITIAL ENCOUNTER: ICD-10-CM

## 2021-11-29 PROCEDURE — 29515 APPLICATION SHORT LEG SPLINT: CPT | Performed by: PODIATRIST

## 2021-11-29 PROCEDURE — 99024 POSTOP FOLLOW-UP VISIT: CPT | Performed by: PODIATRIST

## 2021-11-29 NOTE — PROGRESS NOTES
Postop Progress Note    Subjective    Marissa Bertrand presents to the office 3 days  following foot sx. Eating a regular diet without difficulty. Bowel movements are normal. Patient reports wound healing well. Pain is controlled with current analgesics. Patient relates that she fell yesterday patient is taken the dressing off. Objective    Vitals:    11/29/21 0755   Temp: 97.2 °F (36.2 °C)     General: alert, cooperative and no distress  Incision: healing well, no drainage, no erythema, no hernia, no seroma, no swelling    Assessment    Doing well postoperatively. Plan   1. Continue any current medications  2. Wound care discussed  3. Wound/Incision: healing well  4. Disposition:   Limited activities. No heavy lifting. No strenuous exercise. Should not return to gym class or sports until cleared by a physician. 5. Diet: regular diet  6. Follow up: 1 week. Patient had x-rays showed no change postop doing excellent. Patient did have a slight opening of the wound distally this was closed with Dermabond. A new posterior splint was applied. A  fiberglass below-knee posterior splint was applied to  Right. Neurovascular status was checked pre and post application. Patient was neurovascularly intact after the application process. The patient denied any issues with fit or comfort of the cast/splint. Patient insrtucted to keep cast/splint clean and dry, don't get wet, and NO activities that put them at risk for falling. Patient instructed to call our office if there are any issues with the cast/splint. The cast splint was applied to promote healing of the affected area the cast or splint is needed for healing   Procedure was discussed with the patient. pt  was wrapped in padding. pt was then wrapped in fiberglass. The ankle was manipulated bringing it to neutral dorsiflexion.   An aggressive mold was applied at the medial lateral malleolus as well as around the heel to aid in manipulating the fracture

## 2021-12-09 ENCOUNTER — OFFICE VISIT (OUTPATIENT)
Dept: PODIATRY | Age: 56
End: 2021-12-09
Payer: COMMERCIAL

## 2021-12-09 VITALS — WEIGHT: 210 LBS | TEMPERATURE: 98.2 F | BODY MASS INDEX: 34.95 KG/M2

## 2021-12-09 DIAGNOSIS — G89.18 POST-OP PAIN: Primary | ICD-10-CM

## 2021-12-09 DIAGNOSIS — M21.962 DEFORMITY OF METATARSAL BONE OF LEFT FOOT: ICD-10-CM

## 2021-12-09 DIAGNOSIS — Z09 POSTOPERATIVE EXAMINATION: ICD-10-CM

## 2021-12-09 PROCEDURE — 99024 POSTOP FOLLOW-UP VISIT: CPT | Performed by: PODIATRIST

## 2021-12-09 PROCEDURE — 96372 THER/PROPH/DIAG INJ SC/IM: CPT | Performed by: PODIATRIST

## 2021-12-09 RX ORDER — CEPHALEXIN 500 MG/1
500 CAPSULE ORAL 2 TIMES DAILY
Qty: 14 CAPSULE | Refills: 0 | Status: SHIPPED | OUTPATIENT
Start: 2021-12-09 | End: 2021-12-16

## 2021-12-16 ENCOUNTER — OFFICE VISIT (OUTPATIENT)
Dept: PODIATRY | Age: 56
End: 2021-12-16

## 2021-12-16 VITALS — WEIGHT: 210 LBS | BODY MASS INDEX: 34.95 KG/M2 | TEMPERATURE: 98.3 F

## 2021-12-16 DIAGNOSIS — Z09 POSTOPERATIVE EXAMINATION: Primary | ICD-10-CM

## 2021-12-16 PROCEDURE — 99024 POSTOP FOLLOW-UP VISIT: CPT | Performed by: PODIATRIST

## 2021-12-16 NOTE — PROGRESS NOTES
Postop Progress Note    Subjective    Marissa Olvera presents to the office 4 weeks  following foot sx. Eating a regular diet without difficulty. Bowel movements are normal. Patient reports wound healing well. The patient is not having any pain. Objective    Vitals:    12/16/21 0909   Temp: 98.3 °F (36.8 °C)     General: alert, cooperative and no distress  Incision: healing well, no drainage, no erythema, no hernia, no seroma, no swelling, well approximated    Assessment    Doing well postoperatively. Plan   1. Continue any current medications  2. Wound care discussed  3. Wound/Incision: healing well  4. Disposition:   Limited activities. No heavy lifting. No strenuous exercise. Should not return to gym class or sports until cleared by a physician. 5. Diet: regular diet  6. Follow up: 2 weeks. XR FOOT RIGHT (MIN 3 VIEWS)    Result Date: 12/10/2021  EXAMINATION: THREE XRAY VIEWS OF THE RIGHT FOOT 12/9/2021 4:01 pm COMPARISON: Comparison is dated November 29, 2021 HISTORY: ORDERING SYSTEM PROVIDED HISTORY: Post-op pain TECHNOLOGIST PROVIDED HISTORY: Reason for exam:->non wb FINDINGS: Addition is status post surgical fusion at the 1st metatarsophalangeal joint with healing fracture of the mid to distal metatarsal along with adjacent bony callus. Lucency remains through the fracture line. Alignment is stable. Postoperative changes are seen at the distal aspect of the 1st metatarsal phalangeal joint unchanged. Overall stable appearance from prior exam.  No new abnormality seen. Postoperative changes with healing fracture of the 2nd metatarsal Postoperative changes at the 1st metatarsophalangeal joint unchanged     XR FOOT RIGHT (MIN 3 VIEWS)    Result Date: 11/29/2021  EXAMINATION: THREE XRAY VIEWS OF THE RIGHT FOOT 11/29/2021 7:58 am COMPARISON: None.  HISTORY: ORDERING SYSTEM PROVIDED HISTORY: Post-op pain TECHNOLOGIST PROVIDED HISTORY: Reason for exam:->non wb FINDINGS: Three views of the right foot were obtained. Comparison is made to patient's prior study of 11/11/2021 and 10/21/2021 There are postsurgical changes of the right great toe. The patient has undergone fusion of the left 2nd digit. Plate and screws traverse the 2nd metatarsophalangeal joint. There is a fracture through the distal 1/3 of the 2nd metatarsal. There is no hardware complication. The 3rd, 4th and 5th digits are unremarkable. 1. Status post fusion of the 2nd metatarsophalangeal joint. Plate and screws traverse the joint as well as a fracture of the distal 1/3 of the metatarsal. 2. Stable postoperative changes of the right great toe. FLUORO FOR SURGICAL PROCEDURES    Result Date: 11/26/2021  EXAMINATION: SPOT FLUOROSCOPIC IMAGES 11/26/2021 10:11 am TECHNIQUE: Fluoroscopy was provided by the radiology department for procedure. Radiologist was not present during examination. FLUOROSCOPY DOSE AND TYPE OR TIME AND EXPOSURES: Fluoroscopy time equals 42.5 seconds. Total dose equals 0.8 mGy COMPARISON: None HISTORY: ORDERING SYSTEM PROVIDED HISTORY: rt foot TECHNOLOGIST PROVIDED HISTORY: Reason for exam:->rt foot Intraprocedural imaging. FINDINGS: 1 spot images of the foot were obtained. Intraprocedural fluoroscopic spot images as above. See separate procedure report for more information.          Electronically signed by Kaden Kong DPM on 12/16/2021 at 9:38 AM

## 2021-12-22 NOTE — PROGRESS NOTES
Nephrology Progress Note      Subjective    He feels better today.   Now on NC 6 L  Creatinine better  Notes that he definitely feels he was dehydrated prior to coming in even though he was trying to stay hydrated with gatorade    Objective  Vitals with min/max:      Vital Last Value 24 Hour Range   Temperature 97.5 °F (36.4 °C) (12/22/21 0745) Temp  Min: 97.5 °F (36.4 °C)  Max: 99 °F (37.2 °C)   Pulse 76 (12/22/21 0745) Pulse  Min: 73  Max: 84   Respiratory 16 (12/22/21 0745) Resp  Min: 16  Max: 20   Non-Invasive  Blood Pressure 118/72 (12/22/21 0745) BP  Min: 109/66  Max: 135/82   Pulse Oximetry 94 % (12/22/21 0745) SpO2  Min: 91 %  Max: 96 %   Arterial   Blood Pressure   No data recorded      I/O's    Intake/Output Summary (Last 24 hours) at 12/22/2021 1037  Last data filed at 12/22/2021 0934  Gross per 24 hour   Intake 240 ml   Output 1425 ml   Net -1185 ml       Physical Exam  Vitals reviewed.   Constitutional:       General: He is not in acute distress.  Cardiovascular:      Rate and Rhythm: Normal rate and regular rhythm.   Pulmonary:      Effort: Pulmonary effort is normal.      Breath sounds: Normal breath sounds.   Abdominal:      Palpations: Abdomen is soft.      Tenderness: There is no abdominal tenderness.   Musculoskeletal:      Right lower leg: No edema.      Left lower leg: No edema.   Neurological:      Mental Status: He is alert.         Labs   Recent Results (from the past 48 hour(s))   Comprehensive Metabolic Panel    Collection Time: 12/20/21  1:42 PM   Result Value Ref Range    Fasting Status      Sodium 137 135 - 145 mmol/L    Potassium 3.4 3.4 - 5.1 mmol/L    Chloride 100 98 - 107 mmol/L    Carbon Dioxide 29 21 - 32 mmol/L    Anion Gap 11 10 - 20 mmol/L    Glucose 142 (H) 70 - 99 mg/dL    BUN 53 (H) 6 - 20 mg/dL    Creatinine 2.24 (H) 0.67 - 1.17 mg/dL    Glomerular Filtration Rate 31 (L) >=60    BUN/ Creatinine Ratio 24 7 - 25    Calcium 9.8 8.4 - 10.2 mg/dL    Bilirubin, Total 0.5 0.2 - 1.0  daily for cholesterol 90 tablet 3    losartan-hydroCHLOROthiazide (HYZAAR) 100-25 MG per tablet 1 tab daily 90 tablet 3     Current Facility-Administered Medications on File Prior to Visit   Medication Dose Route Frequency Provider Last Rate Last Admin    lidocaine-EPINEPHrine 1 percent-1:[id number] injection 1 mL  1 mL IntraDERmal Once Shashi Bedolla MD           Patient Active Problem List   Diagnosis Code    Absence of menstruation N91.2    Essential hypertension I10    Mixed hyperlipidemia E78.2    Long term current use of diuretic Z79.899    Posterior tibial tendon dysfunction, right M76.821    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 M21.962    Hammertoe of left foot M20.42     _________________________________________________________  Past Medical History:   Diagnosis Date    Hyperlipidemia     Hypertension     Prolonged emergence from general anesthesia        Family History   Problem Relation Age of Onset    Hypertension Father     Heart Attack Father     Stroke Mother     Hypertension Mother     Cancer Brother         renal    Kidney Cancer Brother     Thyroid Disease Sister     Diabetes Brother     Thyroid Disease Brother        Past Surgical History:   Procedure Laterality Date    ARTHRODESIS Right 9/24/2021    ARTHRODESIS SECOND MPJ RIGHT FOOT ARTHRODESIS SECOND IPJ RIGHT FOOT performed by Argentina De La Rosa DPM at [address] [place] [address] Bilateral 03/25/2019    BREAST REDUCTION SURGERY Bilateral 3/25/2019    BILATERAL BREAST REDUCTION performed [facility] Shi [facility] at [facility]  2005    FOOT SURGERY      x2    LAPAROSCOPY  2007    right ovarian cyst    LAPAROSCOPY  2000    right tubal preg       Social History     Tobacco Use    Smoking status: Never Smoker    Smokeless tobacco: Never Used   Vaping Use    Vaping Use: Never used   Substance Use Topics    Alcohol use: No    Drug use: Never       Chart reviewed and updated where mg/dL    GOT/AST 29 <=37 Units/L    GPT/ALT 21 <64 Units/L    Alkaline Phosphatase 53 45 - 117 Units/L    Albumin 3.1 (L) 3.6 - 5.1 g/dL    Protein, Total 8.2 6.4 - 8.2 g/dL    Globulin 5.1 (H) 2.0 - 4.0 g/dL    A/G Ratio 0.6 (L) 1.0 - 2.4   CBC with Automated Differential (performable only)    Collection Time: 12/20/21  1:42 PM   Result Value Ref Range    WBC 11.1 (H) 4.2 - 11.0 K/mcL    RBC 4.44 (L) 4.50 - 5.90 mil/mcL    HGB 13.9 13.0 - 17.0 g/dL    HCT 39.4 39.0 - 51.0 %    MCV 88.7 78.0 - 100.0 fl    MCH 31.3 26.0 - 34.0 pg    MCHC 35.3 32.0 - 36.5 g/dL    RDW-CV 13.7 11.0 - 15.0 %    RDW-SD 44.6 39.0 - 50.0 fL     140 - 450 K/mcL    NRBC 0 <=0 /100 WBC    Neutrophil, Percent 91 %    Lymphocytes, Percent 5 %    Mono, Percent 4 %    Eosinophils, Percent 0 %    Basophils, Percent 0 %    Immature Granulocytes 0 %    Absolute Neutrophils 10.1 (H) 1.8 - 7.7 K/mcL    Absolute Lymphocytes 0.5 (L) 1.0 - 4.0 K/mcL    Absolute Monocytes 0.4 0.3 - 0.9 K/mcL    Absolute Eosinophils  0.0 0.0 - 0.5 K/mcL    Absolute Basophils 0.0 0.0 - 0.3 K/mcL    Absolute Immmature Granulocytes 0.0 0.0 - 0.2 K/mcL   C Reactive Protein    Collection Time: 12/20/21  1:42 PM   Result Value Ref Range    C-Reactive Protein 15.0 (H) <=1.0 mg/dL   NT proBNP    Collection Time: 12/20/21  1:42 PM   Result Value Ref Range    NT-proBNP 201 (H) <=125 pg/mL   Lactate Dehydrogenase    Collection Time: 12/20/21  1:42 PM   Result Value Ref Range    LD, Total 413 (H) 86 - 234 Units/L   TROPONIN I, HIGH SENSITIVITY    Collection Time: 12/20/21  1:42 PM   Result Value Ref Range    Troponin I, High Sensitivity 10 <77 ng/L   D Dimer, Quantitative    Collection Time: 12/20/21  5:38 PM   Result Value Ref Range    D Dimer, Quantitative 1.62 (H) <0.57 mg/L (FEU)   Ferritin    Collection Time: 12/20/21  5:38 PM   Result Value Ref Range    Ferritin 2,393 (H) 26 - 388 ng/mL   Hepatitis Panel Acute With Reflex    Collection Time: 12/20/21  5:38 PM   Result  Value Ref Range    Hepatitis A Antibody, IgM Negative Negative    Hepatitis A Interpretation      Hepatitis B Surface Antigen Negative Negative    Hepatitis B Core Antibody, IgM Negative Negative    Hepatitis B Interpretation      Hepatitis C Antibody Negative Negative   Procalcitonin    Collection Time: 12/20/21  9:04 PM   Result Value Ref Range    Procalcitonin 2.31 (H) <=0.09 ng/mL   Comprehensive Metabolic Panel    Collection Time: 12/21/21  7:10 AM   Result Value Ref Range    Fasting Status      Sodium 140 135 - 145 mmol/L    Potassium 3.2 (L) 3.4 - 5.1 mmol/L    Chloride 103 98 - 107 mmol/L    Carbon Dioxide 30 21 - 32 mmol/L    Anion Gap 10 10 - 20 mmol/L    Glucose 119 (H) 70 - 99 mg/dL    BUN 63 (H) 6 - 20 mg/dL    Creatinine 2.24 (H) 0.67 - 1.17 mg/dL    Glomerular Filtration Rate 31 (L) >=60    BUN/ Creatinine Ratio 28 (H) 7 - 25    Calcium 9.4 8.4 - 10.2 mg/dL    Bilirubin, Total 0.4 0.2 - 1.0 mg/dL    GOT/AST 17 <=37 Units/L    GPT/ALT 20 <64 Units/L    Alkaline Phosphatase 49 45 - 117 Units/L    Albumin 2.8 (L) 3.6 - 5.1 g/dL    Protein, Total 7.3 6.4 - 8.2 g/dL    Globulin 4.5 (H) 2.0 - 4.0 g/dL    A/G Ratio 0.6 (L) 1.0 - 2.4   Magnesium    Collection Time: 12/21/21  7:10 AM   Result Value Ref Range    Magnesium 2.8 (H) 1.7 - 2.4 mg/dL   Phosphorus    Collection Time: 12/21/21  7:10 AM   Result Value Ref Range    Phosphorus 4.5 2.4 - 4.7 mg/dL   CBC with Automated Differential (performable only)    Collection Time: 12/21/21  7:10 AM   Result Value Ref Range    WBC 5.6 4.2 - 11.0 K/mcL    RBC 4.02 (L) 4.50 - 5.90 mil/mcL    HGB 12.4 (L) 13.0 - 17.0 g/dL    HCT 36.2 (L) 39.0 - 51.0 %    MCV 90.0 78.0 - 100.0 fl    MCH 30.8 26.0 - 34.0 pg    MCHC 34.3 32.0 - 36.5 g/dL    RDW-CV 13.7 11.0 - 15.0 %    RDW-SD 45.3 39.0 - 50.0 fL     140 - 450 K/mcL    NRBC 0 <=0 /100 WBC    Neutrophil, Percent 83 %    Lymphocytes, Percent 8 %    Mono, Percent 8 %    Eosinophils, Percent 0 %    Basophils, Percent 0 %  appropriate for PMH, Fam, and Soc Hx.  _________________________________________________________  ROS: POSITIVE: As in the HPI. Otherwise Pertinent negatives are negative.    __________________________________________________________  Physical Exam   Constitutional:    She is oriented to person, place, and time. She appears well-developed and well-nourished. Eyes:    Conjunctivae are normal.    Pupils are equal, round, and reactive to light. EOMI. Neck:    Normal range of motion. No thyromegaly or nodules noted. No bruit. No LAD. Cardiovascular:    Normal rate, regular rhythm and normal heart sounds. No murmur. No gallop and no friction rub. Pulmonary/Chest:    Effort normal and breath sounds normal.    No wheezes. No rales or rhonchi. Abdominal:    Soft. Bowel sounds are normal.    No distension. No tenderness. Musculoskeletal:    Normal range of motion. No joint swelling noted. No peripheral edema. Skin:    Skin is warm and dry. No rashes, No lesions. Psychiatric:    She has a normal mood and affect. Normal groom and dress. No SI or HI.   ________________________________________________________    This note may have been created using dictation software.  Efforts were made to reduce errors, but some may persist.    Immature Granulocytes 1 %    Absolute Neutrophils 4.7 1.8 - 7.7 K/mcL    Absolute Lymphocytes 0.5 (L) 1.0 - 4.0 K/mcL    Absolute Monocytes 0.4 0.3 - 0.9 K/mcL    Absolute Eosinophils  0.0 0.0 - 0.5 K/mcL    Absolute Basophils 0.0 0.0 - 0.3 K/mcL    Absolute Immmature Granulocytes 0.0 0.0 - 0.2 K/mcL   Sodium, Urine    Collection Time: 12/21/21 12:02 PM   Result Value Ref Range    Sodium, Urine 12 mmol/L   Creatinine, Urine    Collection Time: 12/21/21 12:02 PM   Result Value Ref Range    Creatinine, Urine 135.00 mg/dL   Comprehensive Metabolic Panel    Collection Time: 12/22/21  5:44 AM   Result Value Ref Range    Fasting Status      Sodium 141 135 - 145 mmol/L    Potassium 3.1 (L) 3.4 - 5.1 mmol/L    Chloride 105 98 - 107 mmol/L    Carbon Dioxide 28 21 - 32 mmol/L    Anion Gap 11 10 - 20 mmol/L    Glucose 110 (H) 70 - 99 mg/dL    BUN 49 (H) 6 - 20 mg/dL    Creatinine 1.54 (H) 0.67 - 1.17 mg/dL    Glomerular Filtration Rate 48 (L) >=60    BUN/ Creatinine Ratio 32 (H) 7 - 25    Calcium 9.1 8.4 - 10.2 mg/dL    Bilirubin, Total 0.4 0.2 - 1.0 mg/dL    GOT/AST 19 <=37 Units/L    GPT/ALT 20 <64 Units/L    Alkaline Phosphatase 45 45 - 117 Units/L    Albumin 2.7 (L) 3.6 - 5.1 g/dL    Protein, Total 6.8 6.4 - 8.2 g/dL    Globulin 4.1 (H) 2.0 - 4.0 g/dL    A/G Ratio 0.7 (L) 1.0 - 2.4   Procalcitonin    Collection Time: 12/22/21  5:44 AM   Result Value Ref Range    Procalcitonin 0.64 (H) <=0.09 ng/mL   Ferritin    Collection Time: 12/22/21  5:44 AM   Result Value Ref Range    Ferritin 1,436 (H) 26 - 388 ng/mL   C Reactive Protein    Collection Time: 12/22/21  5:44 AM   Result Value Ref Range    C-Reactive Protein 4.6 (H) <=1.0 mg/dL   CBC with Automated Differential (performable only)    Collection Time: 12/22/21  5:44 AM   Result Value Ref Range    WBC 7.1 4.2 - 11.0 K/mcL    RBC 3.88 (L) 4.50 - 5.90 mil/mcL    HGB 12.2 (L) 13.0 - 17.0 g/dL    HCT 35.1 (L) 39.0 - 51.0 %    MCV 90.5 78.0 - 100.0 fl    MCH 31.4 26.0 - 34.0  pg    MCHC 34.8 32.0 - 36.5 g/dL    RDW-CV 13.5 11.0 - 15.0 %    RDW-SD 44.6 39.0 - 50.0 fL     140 - 450 K/mcL    NRBC 0 <=0 /100 WBC    Neutrophil, Percent 83 %    Lymphocytes, Percent 8 %    Mono, Percent 8 %    Eosinophils, Percent 0 %    Basophils, Percent 0 %    Immature Granulocytes 1 %    Absolute Neutrophils 6.0 1.8 - 7.7 K/mcL    Absolute Lymphocytes 0.6 (L) 1.0 - 4.0 K/mcL    Absolute Monocytes 0.6 0.3 - 0.9 K/mcL    Absolute Eosinophils  0.0 0.0 - 0.5 K/mcL    Absolute Basophils 0.0 0.0 - 0.3 K/mcL    Absolute Immmature Granulocytes 0.0 0.0 - 0.2 K/mcL       Imaging  XR CHEST PA OR AP 1 VIEW   Final Result   Pneumonia, compatible with Covid associated infection      Electronically Signed by: ARTURO CEDENO M.D.    Signed on: 12/20/2021 2:03 PM               Assessment and Plan:     CONCETTA on CKD - likely pre-renal given poor PO intake, diarrhea 2/2 COVID + diuretic use at home - improved significantly     -No further IVF, encourage PO intake  -Intake and output  -Avoid NSAIDs and nephrotoxic agents  -Continue to hold home diuretics     Acute hypoxic respiratory failure 2/2 COVID pna      -Mgmt per ID and primary  -On tocilizumab and decadron  -On acyclovir ppx dose, renally dosed      HTN     -Normotensive on bystolic and nifedipine, continue present mgmt      Anxiety disorder - especially surrounding medical care     -Reassurance provided   -Continue home buspirone     Discussed with attending Dr. Pratt.    Please see attending addendum for final recommendations.    Sarai Meadows MD  PGY3, Internal Medicine  Please PS to reach    12/22/2021 10:37 AM

## 2022-01-05 ENCOUNTER — OFFICE VISIT (OUTPATIENT)
Dept: PODIATRY | Age: 57
End: 2022-01-05

## 2022-01-05 VITALS — WEIGHT: 210 LBS | TEMPERATURE: 97.6 F | BODY MASS INDEX: 34.95 KG/M2

## 2022-01-05 DIAGNOSIS — Z09 POSTOPERATIVE EXAMINATION: Primary | ICD-10-CM

## 2022-01-05 PROCEDURE — 99024 POSTOP FOLLOW-UP VISIT: CPT | Performed by: PODIATRIST

## 2022-01-05 NOTE — PROGRESS NOTES
Postop Progress Note    Subjective    Marissa TALI aEstman presents to the office 6 weeks  following foot sx. Eating a regular diet without difficulty. Bowel movements are normal. Patient reports wound healing well. The patient is not having any pain. Objective    Vitals:    01/05/22 1537   Temp: 97.6 °F (36.4 °C)     General: alert, cooperative and no distress  Incision: healing well, no drainage, no erythema, no hernia, no seroma, no swelling, well approximated    Assessment    Doing well postoperatively. Plan   1. Continue any current medications  2. Wound care discussed  3. Wound/Incision: healing well  4. Disposition:   Limited activities. No heavy lifting. No strenuous exercise. Should not return to gym class or sports until cleared by a physician. 5. Diet: regular diet  6. Follow up: 2 weeks. XR FOOT RIGHT (MIN 3 VIEWS)    Result Date: 12/10/2021  EXAMINATION: THREE XRAY VIEWS OF THE RIGHT FOOT 12/9/2021 4:01 pm COMPARISON: Comparison is dated November 29, 2021 HISTORY: ORDERING SYSTEM PROVIDED HISTORY: Post-op pain TECHNOLOGIST PROVIDED HISTORY: Reason for exam:->non wb FINDINGS: Addition is status post surgical fusion at the 1st metatarsophalangeal joint with healing fracture of the mid to distal metatarsal along with adjacent bony callus. Lucency remains through the fracture line. Alignment is stable. Postoperative changes are seen at the distal aspect of the 1st metatarsal phalangeal joint unchanged. Overall stable appearance from prior exam.  No new abnormality seen. Postoperative changes with healing fracture of the 2nd metatarsal Postoperative changes at the 1st metatarsophalangeal joint unchanged     XR FOOT RIGHT (MIN 3 VIEWS)    Result Date: 11/29/2021  EXAMINATION: THREE XRAY VIEWS OF THE RIGHT FOOT 11/29/2021 7:58 am COMPARISON: None.  HISTORY: ORDERING SYSTEM PROVIDED HISTORY: Post-op pain TECHNOLOGIST PROVIDED HISTORY: Reason for exam:->non wb FINDINGS: Three views of the

## 2022-02-17 ENCOUNTER — OFFICE VISIT (OUTPATIENT)
Dept: PODIATRY | Age: 57
End: 2022-02-17

## 2022-02-17 VITALS — WEIGHT: 210 LBS | BODY MASS INDEX: 34.95 KG/M2 | TEMPERATURE: 97.9 F

## 2022-02-17 DIAGNOSIS — Z09 POSTOPERATIVE EXAMINATION: Primary | ICD-10-CM

## 2022-02-17 PROCEDURE — 99024 POSTOP FOLLOW-UP VISIT: CPT | Performed by: PODIATRIST

## 2022-02-17 NOTE — PROGRESS NOTES
Postop Progress Note    Subjective    Marissa Parker presents to the office 10 weeks  following foot sx. Eating a regular diet without difficulty. Bowel movements are normal. Patient reports wound healing well. The patient is not having any pain. Objective    Vitals:    02/17/22 0939   Temp: 97.9 °F (36.6 °C)     General: alert, cooperative and no distress  Incision: healing well, no drainage, no erythema, no hernia, no seroma, no swelling, well approximated    Assessment    Doing well postoperatively. Plan   1. Continue any current medications  2. Wound care discussed  3. Wound/Incision: healing well  4. Disposition:   Limited activities. No heavy lifting. No strenuous exercise. Should not return to gym class or sports until cleared by a physician. 5. Diet: regular diet  6. Follow up: 2 weeks. XR FOOT RIGHT (MIN 3 VIEWS)    Result Date: 12/10/2021  EXAMINATION: THREE XRAY VIEWS OF THE RIGHT FOOT 12/9/2021 4:01 pm COMPARISON: Comparison is dated November 29, 2021 HISTORY: ORDERING SYSTEM PROVIDED HISTORY: Post-op pain TECHNOLOGIST PROVIDED HISTORY: Reason for exam:->non wb FINDINGS: Addition is status post surgical fusion at the 1st metatarsophalangeal joint with healing fracture of the mid to distal metatarsal along with adjacent bony callus. Lucency remains through the fracture line. Alignment is stable. Postoperative changes are seen at the distal aspect of the 1st metatarsal phalangeal joint unchanged. Overall stable appearance from prior exam.  No new abnormality seen. Postoperative changes with healing fracture of the 2nd metatarsal Postoperative changes at the 1st metatarsophalangeal joint unchanged     XR FOOT RIGHT (MIN 3 VIEWS)    Result Date: 11/29/2021  EXAMINATION: THREE XRAY VIEWS OF THE RIGHT FOOT 11/29/2021 7:58 am COMPARISON: None.  HISTORY: ORDERING SYSTEM PROVIDED HISTORY: Post-op pain TECHNOLOGIST PROVIDED HISTORY: Reason for exam:->non wb FINDINGS: Three views of the right foot were obtained. Comparison is made to patient's prior study of 11/11/2021 and 10/21/2021 There are postsurgical changes of the right great toe. The patient has undergone fusion of the left 2nd digit. Plate and screws traverse the 2nd metatarsophalangeal joint. There is a fracture through the distal 1/3 of the 2nd metatarsal. There is no hardware complication. The 3rd, 4th and 5th digits are unremarkable. 1. Status post fusion of the 2nd metatarsophalangeal joint. Plate and screws traverse the joint as well as a fracture of the distal 1/3 of the metatarsal. 2. Stable postoperative changes of the right great toe. FLUORO FOR SURGICAL PROCEDURES    Result Date: 11/26/2021  EXAMINATION: SPOT FLUOROSCOPIC IMAGES 11/26/2021 10:11 am TECHNIQUE: Fluoroscopy was provided by the radiology department for procedure. Radiologist was not present during examination. FLUOROSCOPY DOSE AND TYPE OR TIME AND EXPOSURES: Fluoroscopy time equals 42.5 seconds. Total dose equals 0.8 mGy COMPARISON: None HISTORY: ORDERING SYSTEM PROVIDED HISTORY: rt foot TECHNOLOGIST PROVIDED HISTORY: Reason for exam:->rt foot Intraprocedural imaging. FINDINGS: 1 spot images of the foot were obtained. Intraprocedural fluoroscopic spot images as above. See separate procedure report for more information.          Electronically signed by Ashwin Mesa DPM on 2/17/2022 at 9:50 AM

## 2022-07-18 ENCOUNTER — TELEPHONE (OUTPATIENT)
Dept: FAMILY MEDICINE CLINIC | Age: 57
End: 2022-07-18

## 2022-07-18 DIAGNOSIS — I10 ESSENTIAL HYPERTENSION: ICD-10-CM

## 2022-07-18 RX ORDER — LOSARTAN POTASSIUM AND HYDROCHLOROTHIAZIDE 25; 100 MG/1; MG/1
TABLET ORAL
Qty: 90 TABLET | Refills: 3 | Status: CANCELLED | OUTPATIENT
Start: 2022-07-18

## 2022-07-18 RX ORDER — LOSARTAN POTASSIUM AND HYDROCHLOROTHIAZIDE 25; 100 MG/1; MG/1
TABLET ORAL
Qty: 90 TABLET | Refills: 3 | Status: SHIPPED | OUTPATIENT
Start: 2022-07-18

## 2022-07-18 RX ORDER — ATORVASTATIN CALCIUM 20 MG/1
TABLET, FILM COATED ORAL
Qty: 90 TABLET | Refills: 3 | Status: SHIPPED | OUTPATIENT
Start: 2022-07-18

## 2022-07-18 RX ORDER — ATORVASTATIN CALCIUM 20 MG/1
TABLET, FILM COATED ORAL
Qty: 90 TABLET | Refills: 3 | Status: CANCELLED | OUTPATIENT
Start: 2022-07-18

## 2022-07-18 NOTE — TELEPHONE ENCOUNTER
Last Appointment:  11/22/2021  Future Appointments   Date Time Provider Dionisio Lieberman   9/1/2022  9:40 AM Quirino Pablo  [address]

## 2022-08-25 ENCOUNTER — OFFICE VISIT (OUTPATIENT)
Dept: PODIATRY | Age: 57
End: 2022-08-25
Payer: COMMERCIAL

## 2022-08-25 VITALS
BODY MASS INDEX: 34.95 KG/M2 | TEMPERATURE: 98.2 F | WEIGHT: 210 LBS | SYSTOLIC BLOOD PRESSURE: 142 MMHG | DIASTOLIC BLOOD PRESSURE: 82 MMHG

## 2022-08-25 DIAGNOSIS — M77.51 BURSITIS OF RIGHT FOOT: Primary | ICD-10-CM

## 2022-08-25 PROCEDURE — 99213 OFFICE O/P EST LOW 20 MIN: CPT | Performed by: PODIATRIST

## 2022-08-25 RX ORDER — BACLOFEN 10 MG/1
10 TABLET ORAL EVERY EVENING
Qty: 30 TABLET | Refills: 0 | Status: SHIPPED | OUTPATIENT
Start: 2022-08-25 | End: 2022-09-24

## 2022-08-25 NOTE — PROGRESS NOTES
22  [facility] : 1965 Sex: female  Age: 61 y.o.     Patient was referred by Frances Spring MD    Chief Complaint   Patient presents with    Foot Pain    Post-Op Check       SUBJECTIVE patient is seen today for right foot patient is having some pain and cramping in the medial arch and second toe  HPI  Review of Systems  Const: Denies constitutional symptoms  Musculo: Denies symptoms other than stated above  Skin: Denies symptoms other than stated above       Current Outpatient Medications:     baclofen (LIORESAL) 10 MG tablet, Take 1 tablet by mouth every evening, Disp: 30 tablet, Rfl: 0    atorvastatin (LIPITOR) 20 MG tablet, take 1 tablet by mouth once daily for cholesterol, Disp: 90 tablet, Rfl: 3    losartan-hydroCHLOROthiazide (HYZAAR) 100-25 MG per tablet, 1 tab daily, Disp: 90 tablet, Rfl: 3  Allergies   Allergen Reactions    Sulfa Antibiotics Hives, Shortness Of Breath and Palpitations       Past Medical History:   Diagnosis Date    Hyperlipidemia     Hypertension     Prolonged emergence from general anesthesia      Past Surgical History:   Procedure Laterality Date    ARTHRODESIS Right 2021    ARTHRODESIS SECOND MPJ RIGHT FOOT ARTHRODESIS SECOND IPJ RIGHT FOOT performed by Andre Packer DPM at [address] [place] [address] Bilateral 2019    BREAST REDUCTION SURGERY Bilateral 3/25/2019    BILATERAL BREAST REDUCTION performed by Ela Wang MD at [address] [place] [address]  2005    FOOT SURGERY Right     x2    FOOT SURGERY Right 2021    OPEN REDUCTION INTERNAL FIXATION WITH PLATE SECOND METATARSAL FRACTURE RIGHT FOOT performed by Andre Packer DPM at [address] [place] [address]      right ovarian cyst    LAPAROSCOPY      right tubal preg     Family History   Problem Relation Age of Onset    Hypertension Father     Heart Attack Father     Stroke Mother     Hypertension Mother     Cancer Brother         renal    Kidney Cancer Brother Thyroid Disease Sister     Diabetes Brother     Thyroid Disease Brother      Social History     Socioeconomic History    Marital status:      Spouse name: Not on file    Number of children: Not on file    Years of education: Not on file    Highest education level: Not on file   Occupational History    Not on file   Tobacco Use    Smoking status: Never    Smokeless tobacco: Never   Vaping Use    Vaping Use: Never used   Substance and Sexual Activity    Alcohol use: No    Drug use: Never    Sexual activity: Yes     Partners: Male   Other Topics Concern    Not on file   Social History Narrative    Not on file     Social Determinants of Health     Financial Resource Strain: Not on file   Food Insecurity: Not on file   Transportation Needs: Not on file   Physical Activity: Not on file   Stress: Not on file   Social Connections: Not on file   Intimate Partner Violence: Not on file   Housing Stability: Not on file       Vitals:    08/25/22 0748   BP: (!) 142/82   Temp: 98.2 °F (36.8 °C)   TempSrc: Temporal   Weight: 210 lb (95.3 kg)       Focused Lower Extremity Physical Exam:  Vitals:    08/25/22 0748   BP: (!) 142/82   Temp: 98.2 °F (36.8 °C)        Foot Exam     Ortho Exam    Vascular: pulses  dp  pt    Capillary Refill Time:   Hair growth  Skin:    Edema:    Neurologic:      Musculoskeletal/ Orthopedic examination: Negative pain with palpation to the second digit right foot x-rays taken at a different facility were reviewed no fractures noted. Pain with palpation along the navicular. NAIL   Web space  Derm:          Assessment and Plan: Today I did give the patient a prescription for a muscle relaxer icing stretching over-the-counter orthotics. Reina Jack was seen today for foot pain and post-op check. Diagnoses and all orders for this visit:    Bursitis of right foot    Other orders  -     baclofen (LIORESAL) 10 MG tablet; Take 1 tablet by mouth every evening      No follow-ups on file.       Seen By:  Ciera Dorantes Juanjo Garza DPM      Document was created using voice recognition software. Note was reviewed, however may contain grammatical errors. copious irrigation

## 2022-09-07 RX ORDER — PREDNISONE 10 MG/1
10 TABLET ORAL DAILY
Qty: 18 TABLET | Refills: 1 | Status: SHIPPED | OUTPATIENT
Start: 2022-09-07 | End: 2022-09-16

## 2022-11-11 ENCOUNTER — TELEPHONE (OUTPATIENT)
Dept: FAMILY MEDICINE CLINIC | Age: 57
End: 2022-11-11

## 2022-11-11 NOTE — TELEPHONE ENCOUNTER
Do not need a response urgently at all. But pt has an appt on 12/02 for abdominal bloating and \"right leg ache\"     Pt inquires if we could see her the day prior if anything opens up.  12/01 sched is currently full. There are 2 or more provider fill spots on 12/01    OK for provider fill or leave as is?

## 2022-11-30 ENCOUNTER — TELEPHONE (OUTPATIENT)
Dept: FAMILY MEDICINE CLINIC | Age: 57
End: 2022-11-30

## 2022-11-30 NOTE — TELEPHONE ENCOUNTER
Pt had an appt with Dr. Doug Gould 12/01 at 1PM.    That sppt time was not appropriate for his schedule. I called and left detailed message advising pt that I moved her 1pm appt to later that day at 3:40.     I requested a return call to confirm she got the message of appt time change and to be sure she can make new time of 3:40

## 2022-12-01 ENCOUNTER — OFFICE VISIT (OUTPATIENT)
Dept: FAMILY MEDICINE CLINIC | Age: 57
End: 2022-12-01
Payer: COMMERCIAL

## 2022-12-01 VITALS
TEMPERATURE: 97.6 F | HEIGHT: 65 IN | DIASTOLIC BLOOD PRESSURE: 78 MMHG | WEIGHT: 231 LBS | OXYGEN SATURATION: 98 % | HEART RATE: 76 BPM | SYSTOLIC BLOOD PRESSURE: 132 MMHG | BODY MASS INDEX: 38.49 KG/M2

## 2022-12-01 DIAGNOSIS — R68.81 EARLY SATIETY: Primary | ICD-10-CM

## 2022-12-01 DIAGNOSIS — E78.2 MIXED HYPERLIPIDEMIA: ICD-10-CM

## 2022-12-01 DIAGNOSIS — M79.661 RIGHT CALF PAIN: ICD-10-CM

## 2022-12-01 DIAGNOSIS — R10.13 EPIGASTRIC ABDOMINAL PAIN: ICD-10-CM

## 2022-12-01 LAB — MAMMOGRAPHY, EXTERNAL: NORMAL

## 2022-12-01 PROCEDURE — 99214 OFFICE O/P EST MOD 30 MIN: CPT | Performed by: FAMILY MEDICINE

## 2022-12-01 PROCEDURE — 3074F SYST BP LT 130 MM HG: CPT | Performed by: FAMILY MEDICINE

## 2022-12-01 PROCEDURE — 3078F DIAST BP <80 MM HG: CPT | Performed by: FAMILY MEDICINE

## 2022-12-01 RX ORDER — TIZANIDINE 2 MG/1
2-4 TABLET ORAL NIGHTLY PRN
Qty: 20 TABLET | Refills: 0 | Status: SHIPPED | OUTPATIENT
Start: 2022-12-01

## 2022-12-01 RX ORDER — IBUPROFEN 800 MG/1
TABLET ORAL
COMMUNITY
Start: 2022-10-17

## 2022-12-01 RX ORDER — GABAPENTIN 300 MG/1
300 CAPSULE ORAL DAILY
COMMUNITY
Start: 2022-12-01

## 2022-12-01 RX ORDER — MINOCYCLINE HYDROCHLORIDE 100 MG/1
100 CAPSULE ORAL 2 TIMES DAILY
COMMUNITY
Start: 2022-12-01

## 2022-12-01 ASSESSMENT — PATIENT HEALTH QUESTIONNAIRE - PHQ9
SUM OF ALL RESPONSES TO PHQ QUESTIONS 1-9: 0
SUM OF ALL RESPONSES TO PHQ QUESTIONS 1-9: 0
SUM OF ALL RESPONSES TO PHQ9 QUESTIONS 1 & 2: 0
2. FEELING DOWN, DEPRESSED OR HOPELESS: 0
SUM OF ALL RESPONSES TO PHQ QUESTIONS 1-9: 0
1. LITTLE INTEREST OR PLEASURE IN DOING THINGS: 0
SUM OF ALL RESPONSES TO PHQ QUESTIONS 1-9: 0

## 2022-12-01 NOTE — PROGRESS NOTES
[facility]  Office Progress Note - Dr. Lucinda Harrison  12/1/22    CC:   Chief Complaint   Patient presents with    Bloated     Bloating specifically after meals - onset 2-3 months. Feeling of fullness, belching. Has been trying omeprazole OTC and states she has seen very little improvement. Leg Pain     Pt reports ache in right calf - onset 10 months has been worsening in last 2 months. Surgery with Dr. Venkat Rosa last November - fixed a fracture, and put a second metal plate in right foot. Noticed this calf pain about 2 months after surgery. /78   Pulse 76   Temp 97.6 °F (36.4 °C) (Temporal)   Ht 5' 5\" (1.651 m)   Wt 231 lb (104.8 kg)   LMP  (LMP Unknown)   SpO2 98%   BMI 38.44 kg/m²   Wt Readings from Last 3 Encounters:   12/01/22 231 lb (104.8 kg)   08/25/22 210 lb (95.3 kg)   02/17/22 210 lb (95.3 kg)       HPI: gaining weight   Feel miserable  Stomach issue bothering her - bloating, fullness uncomfortable no matter what she eats. [employer] makes it worse. Early satiety    Mid abd pain comes and goes. Worse after eats with excessive - prilosec OTC did not make a difference. Has been burping which is unusual  /  No bowel changes. No diarrhea. Pap and mammo with dr Marie Jasmine today. Leg pain. Right calf  Nighttime. Spasm/aching type of pain. No stimulus  Massage seems to help. Eases up. Duration 10 months,over time. Hx of right foot operation, 2 surgeries, last surgery was a year ago. No Hx clots. No redness, heat swelling. Has tried motrin 800, biofreeze,     _________________________________________________________    Assessment / Toan Hansen was seen today for bloated and leg pain. Diagnoses and all orders for this visit:    Early satiety  -     CBC with Auto Differential; Future  -     Comprehensive Metabolic Panel; Future  -     TSH; Future  -     Lipase;  Future    Epigastric abdominal pain  -     CBC with Auto Differential; Future  - Comprehensive Metabolic Panel; Future  -     TSH; Future  -     Lipase; Future    We will start with some lab work. If unrevealing likely right upper quadrant ultrasound. May have gallbladder dysfunction or stones. Considering gastroparesis as well, but no history of diabetes. Start there    Mixed hyperlipidemia  -     Lipid Panel; Future  Continue Lipitor 20. Right calf pain  -     tiZANidine (ZANAFLEX) 2 MG tablet; Take 1-2 tablets by mouth nightly as needed (calf muscle spasm)  Seems to be muscular pain. No swelling, redness, history of DVT, present for 10 months making DVT particularly unlikely. Following insertion of her medial hamstring tendon leads to the area of calf pain. This could be mechanical from her gait pattern. Offered some physical therapy but she would like to just try the tizanidine for now    Follow-up after labs and possible right upper quadrant ultrasound. Or as scheduled. Patient counseled to follow up sooner or seek more acute care if symptoms worsening or not improving according to plan. Electronically signed by Axel Trevizo MD on 12/1/2022    _________________________________________________________  Current Outpatient Medications on File Prior to Visit   Medication Sig Dispense Refill    minocycline (MINOCIN;DYNACIN) 100 MG capsule Take 100 mg by mouth 2 times daily      metroNIDAZOLE (METROCREAM) 0.75 % cream apply to face once daily every morning      gabapentin (NEURONTIN) 300 MG capsule Take 300 mg by mouth daily.       ibuprofen (ADVIL;MOTRIN) 800 MG tablet take tablet by mouth three times a day      atorvastatin (LIPITOR) 20 MG tablet take 1 tablet by mouth once daily for cholesterol 90 tablet 3    losartan-hydroCHLOROthiazide (HYZAAR) 100-25 MG per tablet 1 tab daily 90 tablet 3     Current Facility-Administered Medications on File Prior to Visit   Medication Dose Route Frequency Provider Last Rate Last Admin    lidocaine-EPINEPHrine 1 percent-1:279603 injection 1 mL  1 mL IntraDERmal Once Pepito Torres MD           Patient Active Problem List   Diagnosis Code    Absence of menstruation N91.2    Essential hypertension I10    Mixed hyperlipidemia E78.2    Long term current use of diuretic Z79.899    Posterior tibial tendon dysfunction, right M76.821    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 M21.962    Hammertoe of left foot M20.42    Metatarsal fracture, pathologic, right, with nonunion, 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     _________________________________________________________  Past Medical History:   Diagnosis Date    Hyperlipidemia     Hypertension     Prolonged emergence from general anesthesia        Family History   Problem Relation Age of Onset    Hypertension Father     Heart Attack Father     Stroke Mother     Hypertension Mother     Cancer Brother         renal    Kidney Cancer Brother     Thyroid Disease Sister     Diabetes Brother     Thyroid Disease Brother        Past Surgical History:   Procedure Laterality Date    ARTHRODESIS Right 9/24/2021    ARTHRODESIS SECOND MPJ RIGHT FOOT ARTHRODESIS SECOND IPJ RIGHT FOOT performed by Anselmo Oliveros DPM at [facility] Bilateral 03/25/2019    BREAST REDUCTION SURGERY Bilateral 3/25/2019    BILATERAL BREAST REDUCTION performed by Radha Cortes MD at [address] [place] [address]  2005    FOOT SURGERY Right     x2    FOOT SURGERY Right 11/26/2021    OPEN REDUCTION INTERNAL FIXATION WITH PLATE SECOND METATARSAL FRACTURE RIGHT FOOT performed by Anselmo Oliveros DPM at [facility]  2007    right ovarian cyst    LAPAROSCOPY  2000    right tubal preg       Social History     Tobacco Use    Smoking status: Never    Smokeless tobacco: Never   Vaping Use    Vaping Use: Never used   Substance Use Topics    Alcohol use: No    Drug use: Never       Chart reviewed and updated where appropriate for PMH, Fam, and Soc Hx.  _________________________________________________________  ROS: POSITIVE: As in the HPI. Otherwise Pertinent negatives are negative.    __________________________________________________________  Physical Exam   Constitutional:    She is oriented to person, place, and time. She appears well-developed and well-nourished. Eyes:    Conjunctivae are normal.    Pupils are equal, round, and reactive to light. EOMI. Neck:    Normal range of motion. No thyromegaly or nodules noted. No bruit. No LAD. Cardiovascular:    Normal rate, regular rhythm and normal heart sounds. No murmur. No gallop and no friction rub. Pulmonary/Chest:    Effort normal and breath sounds normal.    No wheezes. No rales or rhonchi. Abdominal:    Soft. Bowel sounds are normal.    No distension. Mild discomfort to palpation in the epigastrium to right upper quadrant. Musculoskeletal:    Normal range of motion. Tenderness palpation in the posterior right calf along the band extending from the medial proximal calf down to the mid calf. No joint swelling noted. No peripheral edema. Neurological:    She is A&Ox3    Motor and sensation grossly intact. Normal Gait. Skin:    Skin is warm and dry. No rashes, No lesions. Psychiatric:    She has a normal mood and affect. Normal groom and dress. No SI or HI.   ________________________________________________________    This note may have been created using dictation software.  Efforts were made to reduce errors, but some may persist.

## 2022-12-03 ENCOUNTER — HOSPITAL ENCOUNTER (OUTPATIENT)
Age: 57
Discharge: HOME | End: 2022-12-03
Payer: COMMERCIAL

## 2022-12-03 DIAGNOSIS — E78.2 MIXED HYPERLIPIDEMIA: ICD-10-CM

## 2022-12-03 DIAGNOSIS — R68.81 EARLY SATIETY: ICD-10-CM

## 2022-12-03 DIAGNOSIS — R10.13 EPIGASTRIC ABDOMINAL PAIN: ICD-10-CM

## 2022-12-03 LAB
ALBUMIN SERPL-MCNC: 4 G/DL (ref 3.5–5.2)
ALP BLD-CCNC: 71 U/L (ref 35–104)
ALT SERPL-CCNC: 30 U/L (ref 0–32)
ANION GAP SERPL CALCULATED.3IONS-SCNC: 10 MMOL/L (ref 7–16)
AST SERPL-CCNC: 28 U/L (ref 0–31)
BASOPHILS ABSOLUTE: 0.03 E9/L (ref 0–0.2)
BASOPHILS RELATIVE PERCENT: 0.6 % (ref 0–2)
BILIRUB SERPL-MCNC: 0.6 MG/DL (ref 0–1.2)
BUN BLDV-MCNC: 27 MG/DL (ref 6–20)
CALCIUM SERPL-MCNC: 9.8 MG/DL (ref 8.6–10.2)
CHLORIDE BLD-SCNC: 104 MMOL/L (ref 98–107)
CHOLESTEROL, TOTAL: 166 MG/DL (ref 0–199)
CO2: 28 MMOL/L (ref 22–29)
CREAT SERPL-MCNC: 0.8 MG/DL (ref 0.5–1)
EOSINOPHILS ABSOLUTE: 0.22 E9/L (ref 0.05–0.5)
EOSINOPHILS RELATIVE PERCENT: 4.3 % (ref 0–6)
GFR SERPL CREATININE-BSD FRML MDRD: >60 ML/MIN/1.73
GLUCOSE BLD-MCNC: 86 MG/DL (ref 74–99)
HCT VFR BLD CALC: 42.5 % (ref 34–48)
HDLC SERPL-MCNC: 43 MG/DL
HEMOGLOBIN: 14.3 G/DL (ref 11.5–15.5)
IMMATURE GRANULOCYTES #: 0.01 E9/L
IMMATURE GRANULOCYTES %: 0.2 % (ref 0–5)
LDL CHOLESTEROL CALCULATED: 88 MG/DL (ref 0–99)
LIPASE: 33 U/L (ref 13–60)
LYMPHOCYTES ABSOLUTE: 1.47 E9/L (ref 1.5–4)
LYMPHOCYTES RELATIVE PERCENT: 28.5 % (ref 20–42)
MCH RBC QN AUTO: 30.7 PG (ref 26–35)
MCHC RBC AUTO-ENTMCNC: 33.6 % (ref 32–34.5)
MCV RBC AUTO: 91.2 FL (ref 80–99.9)
MONOCYTES ABSOLUTE: 0.55 E9/L (ref 0.1–0.95)
MONOCYTES RELATIVE PERCENT: 10.7 % (ref 2–12)
NEUTROPHILS ABSOLUTE: 2.88 E9/L (ref 1.8–7.3)
NEUTROPHILS RELATIVE PERCENT: 55.7 % (ref 43–80)
PDW BLD-RTO: 13.7 FL (ref 11.5–15)
PLATELET # BLD: 198 E9/L (ref 130–450)
PMV BLD AUTO: 10 FL (ref 7–12)
POTASSIUM SERPL-SCNC: 4.2 MMOL/L (ref 3.5–5)
RBC # BLD: 4.66 E12/L (ref 3.5–5.5)
SODIUM BLD-SCNC: 142 MMOL/L (ref 132–146)
TOTAL PROTEIN: 6.7 G/DL (ref 6.4–8.3)
TRIGL SERPL-MCNC: 176 MG/DL (ref 0–149)
TSH SERPL DL<=0.05 MIU/L-ACNC: 4.39 UIU/ML (ref 0.27–4.2)
VLDLC SERPL CALC-MCNC: 35 MG/DL
WBC # BLD: 5.2 E9/L (ref 4.5–11.5)

## 2022-12-03 PROCEDURE — 85025 COMPLETE CBC W/AUTO DIFF WBC: CPT

## 2022-12-03 PROCEDURE — 80061 LIPID PANEL: CPT

## 2022-12-03 PROCEDURE — 36415 COLL VENOUS BLD VENIPUNCTURE: CPT

## 2022-12-03 PROCEDURE — 84443 ASSAY THYROID STIM HORMONE: CPT

## 2022-12-03 PROCEDURE — 83690 ASSAY OF LIPASE: CPT

## 2022-12-03 PROCEDURE — 80053 COMPREHEN METABOLIC PANEL: CPT

## 2022-12-05 ENCOUNTER — PATIENT MESSAGE (OUTPATIENT)
Dept: FAMILY MEDICINE CLINIC | Age: 57
End: 2022-12-05

## 2022-12-05 DIAGNOSIS — R11.0 POSTPRANDIAL NAUSEA: Primary | ICD-10-CM

## 2022-12-05 RX ORDER — PANTOPRAZOLE SODIUM 40 MG/1
40 TABLET, DELAYED RELEASE ORAL
Qty: 30 TABLET | Refills: 0 | Status: SHIPPED | OUTPATIENT
Start: 2022-12-05

## 2022-12-05 NOTE — TELEPHONE ENCOUNTER
From: Wei Gregory  To: Dr. Ricardo Roe  Sent: 12/5/2022 8:12 AM EST  Subject: Possible ulcer    My blood work came back pretty normal. The more research I did I think it is an ulcer. Would you prescribe a Protonix to help with this? I would like to at least try it. I would like to hold off on the GB ultrasound for now. Also the tizanadine has improved my calf issue. I know there are no refills, is this something you would refill? Thank you Maryanne Norris.

## 2022-12-16 DIAGNOSIS — M79.661 RIGHT CALF PAIN: ICD-10-CM

## 2022-12-16 RX ORDER — TIZANIDINE 2 MG/1
2-4 TABLET ORAL NIGHTLY PRN
Qty: 30 TABLET | Refills: 1 | Status: SHIPPED | OUTPATIENT
Start: 2022-12-16

## 2022-12-28 RX ORDER — PANTOPRAZOLE SODIUM 40 MG/1
TABLET, DELAYED RELEASE ORAL
Qty: 30 TABLET | Refills: 0 | OUTPATIENT
Start: 2022-12-28

## 2023-05-25 ENCOUNTER — OFFICE VISIT (OUTPATIENT)
Dept: PODIATRY | Age: 58
End: 2023-05-25
Payer: COMMERCIAL

## 2023-05-25 VITALS — TEMPERATURE: 97.1 F | WEIGHT: 231 LBS | BODY MASS INDEX: 38.44 KG/M2

## 2023-05-25 DIAGNOSIS — T84.84XA PAINFUL ORTHOPAEDIC HARDWARE (HCC): Primary | ICD-10-CM

## 2023-05-25 PROCEDURE — 99212 OFFICE O/P EST SF 10 MIN: CPT | Performed by: PODIATRIST

## 2023-05-25 NOTE — PROGRESS NOTES
REDUCTION SURGERY Bilateral 3/25/2019    BILATERAL BREAST REDUCTION performed by Bryn Koehler MD at [facility]  2005    FOOT SURGERY Right     x2    FOOT SURGERY Right 11/26/2021    OPEN REDUCTION INTERNAL FIXATION WITH PLATE SECOND METATARSAL FRACTURE RIGHT FOOT performed by Olga Coleman DPM at [facility]  2007    right ovarian cyst    LAPAROSCOPY  2000    right tubal preg     Family History   Problem Relation Age of Onset    Hypertension Father     Heart Attack Father     Stroke Mother     Hypertension Mother     Cancer Brother         renal    Kidney Cancer Brother     Thyroid Disease Sister     Diabetes Brother     Thyroid Disease Brother      Social History     Socioeconomic History    Marital status:      Spouse name: Not on file    Number of children: Not on file    Years of education: Not on file    Highest education level: Not on file   Occupational History    Not on file   Tobacco Use    Smoking status: Never    Smokeless tobacco: Never   Vaping Use    Vaping Use: Never used   Substance and Sexual Activity    Alcohol use: No    Drug use: Never    Sexual activity: Yes     Partners: Male   Other Topics Concern    Not on file   Social History Narrative    Not on file     Social Determinants of Health     Financial Resource Strain: Not on file   Food Insecurity: Not on file   Transportation Needs: Not on file   Physical Activity: Not on file   Stress: Not on file   Social Connections: Not on file   Intimate Partner Violence: Not on file   Housing Stability: Not on file       Vitals:    05/25/23 0757   Temp: 97.1 °F (36.2 °C)   TempSrc: Temporal   Weight: 231 lb (104.8 kg)       Focused Lower Extremity Physical Exam:  Vitals:    05/25/23 0757   Temp: 97.1 °F (36.2 °C)        Foot Exam     Ortho Exam    Vascular: pulses  dp  pt    Capillary Refill Time:   Hair growth  Skin:    Edema:    Neurologic:      Musculoskeletal/ Orthopedic examination:    NAIL  Web

## 2023-05-30 ENCOUNTER — TELEPHONE (OUTPATIENT)
Dept: PODIATRY | Age: 58
End: 2023-05-30

## 2023-05-30 ENCOUNTER — TELEPHONE (OUTPATIENT)
Dept: FAMILY MEDICINE CLINIC | Age: 58
End: 2023-05-30

## 2023-05-30 NOTE — TELEPHONE ENCOUNTER
Spoke with Sharmin Dean; that will not work for her schedule. Any other AM within the 30 day window will likely work if we let her know with enough time.

## 2023-05-30 NOTE — TELEPHONE ENCOUNTER
Prior Authorization Form:      DEMOGRAPHICS:                     Patient Name:  Candy Evans  Patient :  1965            Insurance:  Payor: [employer] / Plan: [address] [place] [address] / Product Type: *No Product type* /   Insurance ID Number:    Payer/Plan Subscr  Sex Relation Sub. Ins. ID Effective Group Num   1.  [id number] [place] 10/[birth year]1960 Male Spouse [id number] [id number]         DIAGNOSIS & PROCEDURE:                       Procedure/Operation: Put orders in epic for sx 2023  Removal painful hardware right foot  Lmac  Arthrex            CPT Code: 07182    Diagnosis:  painful hardware right foot    ICD10 Code: t84.84xa    Location:  right foot    Surgeon:  dr. Paulino diaz    SCHEDULING INFORMATION:                          Date: 2023  Time:              Anesthesia: lmac                                                     Status:  Outpatient        Special Comments:       Electronically signed by King Aquino LPN on  at 7:73 PM normal rate and rhythm, no chest pain and no edema.

## 2023-06-30 DIAGNOSIS — I10 ESSENTIAL HYPERTENSION: ICD-10-CM

## 2023-06-30 RX ORDER — LOSARTAN POTASSIUM AND HYDROCHLOROTHIAZIDE 25; 100 MG/1; MG/1
TABLET ORAL
Qty: 90 TABLET | Refills: 3 | Status: SHIPPED | OUTPATIENT
Start: 2023-06-30

## 2023-07-03 RX ORDER — ATORVASTATIN CALCIUM 20 MG/1
TABLET, FILM COATED ORAL
Qty: 90 TABLET | Refills: 3 | Status: SHIPPED | OUTPATIENT
Start: 2023-07-03

## 2023-07-03 NOTE — TELEPHONE ENCOUNTER
Last Appointment:  12/1/2022  Future Appointments   Date Time Provider [address]   7/21/2023 11:00 AM [facility]   8/8/2023  8:00 AM CHRISTIANNE Renner [facility]

## 2023-07-21 ENCOUNTER — OFFICE VISIT (OUTPATIENT)
Dept: FAMILY MEDICINE CLINIC | Age: 58
End: 2023-07-21
Payer: COMMERCIAL

## 2023-07-21 VITALS
HEIGHT: 65 IN | SYSTOLIC BLOOD PRESSURE: 120 MMHG | TEMPERATURE: 97.8 F | HEART RATE: 101 BPM | BODY MASS INDEX: 36.65 KG/M2 | DIASTOLIC BLOOD PRESSURE: 76 MMHG | OXYGEN SATURATION: 97 % | WEIGHT: 220 LBS

## 2023-07-21 DIAGNOSIS — Z01.811 PRE-OP CHEST EXAM: Primary | ICD-10-CM

## 2023-07-21 PROCEDURE — 3074F SYST BP LT 130 MM HG: CPT | Performed by: FAMILY MEDICINE

## 2023-07-21 PROCEDURE — 3078F DIAST BP <80 MM HG: CPT | Performed by: FAMILY MEDICINE

## 2023-07-21 PROCEDURE — 99213 OFFICE O/P EST LOW 20 MIN: CPT | Performed by: FAMILY MEDICINE

## 2023-07-21 PROCEDURE — 93000 ELECTROCARDIOGRAM COMPLETE: CPT | Performed by: FAMILY MEDICINE

## 2023-07-21 ASSESSMENT — PATIENT HEALTH QUESTIONNAIRE - PHQ9
2. FEELING DOWN, DEPRESSED OR HOPELESS: 0
SUM OF ALL RESPONSES TO PHQ QUESTIONS 1-9: 0
1. LITTLE INTEREST OR PLEASURE IN DOING THINGS: 0
SUM OF ALL RESPONSES TO PHQ QUESTIONS 1-9: 0
SUM OF ALL RESPONSES TO PHQ QUESTIONS 1-9: 0
SUM OF ALL RESPONSES TO PHQ9 QUESTIONS 1 & 2: 0
SUM OF ALL RESPONSES TO PHQ QUESTIONS 1-9: 0

## 2023-07-31 ENCOUNTER — PREP FOR PROCEDURE (OUTPATIENT)
Dept: PODIATRY | Age: 58
End: 2023-07-31

## 2023-07-31 RX ORDER — SODIUM CHLORIDE 0.9 % (FLUSH) 0.9 %
5-40 SYRINGE (ML) INJECTION EVERY 12 HOURS SCHEDULED
Status: CANCELLED | OUTPATIENT
Start: 2023-07-31

## 2023-07-31 RX ORDER — SODIUM CHLORIDE 9 MG/ML
INJECTION, SOLUTION INTRAVENOUS PRN
Status: CANCELLED | OUTPATIENT
Start: 2023-07-31

## 2023-07-31 RX ORDER — SODIUM CHLORIDE 0.9 % (FLUSH) 0.9 %
5-40 SYRINGE (ML) INJECTION PRN
Status: CANCELLED | OUTPATIENT
Start: 2023-07-31

## 2023-08-01 NOTE — PROGRESS NOTES
[address] [employer] PRE-ADMISSION TESTING INSTRUCTIONS    The Preadmission Testing patient is instructed accordingly using the following criteria (check applicable):    ARRIVAL INSTRUCTIONS:  [x] Parking the day of Surgery is located in the Main Entrance lot. Upon entering the door, make an immediate right to the surgery reception desk    [x] Bring photo ID and insurance card    [] Bring in a copy of Living will or Durable Power of  papers. [x] Please be sure to arrange for responsible adult to provide transportation to and from the hospital    [x] Please arrange for responsible adult to be with you for the 24 hour period post procedure due to having anesthesia    [x] If you awake am of surgery not feeling well or have temperature >100 please call [phone]    GENERAL INSTRUCTIONS:    [x] Nothing by mouth after midnight, including gum, candy, mints or water    [x] You may brush your teeth, but do not swallow any water    [] Take medications as instructed with 1-2 oz of water    [] Stop herbal supplements and vitamins 5 days prior to procedure    [x] Follow preop dosing of blood thinners per physician instructions    [] Take 1/2 dose of evening insulin, but no insulin after midnight    [] No oral diabetic medications after midnight    [] If diabetic and have low blood sugar or feel symptomatic, take 1-2oz apple juice only    [] Bring inhalers day of surgery    [] Bring C-PAP/ Bi-Pap day of surgery    [] Bring urine specimen day of surgery    [x] Shower or bath with soap, lather and rinse well, AM of Surgery, no lotion, powders or creams to surgical site    [] Follow bowel prep as instructed per surgeon    [x] No tobacco products within 24 hours of surgery     [x] No alcohol or illegal drug use within 24 hours of surgery.     [x] Jewelry, body piercing's, eyeglasses, contact lenses and dentures are not permitted into surgery (bring cases)      [x] Please do not wear any nail polish,

## 2023-08-03 ENCOUNTER — ANESTHESIA EVENT (OUTPATIENT)
Dept: OPERATING ROOM | Age: 58
End: 2023-08-03
Payer: COMMERCIAL

## 2023-08-04 ENCOUNTER — APPOINTMENT (OUTPATIENT)
Dept: GENERAL RADIOLOGY | Age: 58
End: 2023-08-04
Attending: PODIATRIST
Payer: COMMERCIAL

## 2023-08-04 ENCOUNTER — HOSPITAL ENCOUNTER (OUTPATIENT)
Age: 58
Setting detail: OUTPATIENT SURGERY
Discharge: HOME | End: 2023-08-04
Attending: PODIATRIST | Admitting: PODIATRIST
Payer: COMMERCIAL

## 2023-08-04 ENCOUNTER — ANESTHESIA (OUTPATIENT)
Dept: OPERATING ROOM | Age: 58
End: 2023-08-04
Payer: COMMERCIAL

## 2023-08-04 VITALS
RESPIRATION RATE: 15 BRPM | WEIGHT: 220 LBS | SYSTOLIC BLOOD PRESSURE: 132 MMHG | OXYGEN SATURATION: 96 % | DIASTOLIC BLOOD PRESSURE: 59 MMHG | TEMPERATURE: 95.8 F | HEART RATE: 73 BPM | BODY MASS INDEX: 36.65 KG/M2 | HEIGHT: 65 IN

## 2023-08-04 DIAGNOSIS — G89.18 POST-OP PAIN: Primary | ICD-10-CM

## 2023-08-04 DIAGNOSIS — T84.84XA PAIN IN PROSTHETIC JOINT, INITIAL ENCOUNTER (HCC): ICD-10-CM

## 2023-08-04 PROCEDURE — 2580000003 HC RX 258: Performed by: PODIATRIST

## 2023-08-04 PROCEDURE — 6360000002 HC RX W HCPCS: Performed by: PODIATRIST

## 2023-08-04 PROCEDURE — 2580000003 HC RX 258

## 2023-08-04 PROCEDURE — 20680 REMOVAL OF IMPLANT DEEP: CPT | Performed by: PODIATRIST

## 2023-08-04 PROCEDURE — 7100000010 HC PHASE II RECOVERY - FIRST 15 MIN: Performed by: PODIATRIST

## 2023-08-04 PROCEDURE — 3600000013 HC SURGERY LEVEL 3 ADDTL 15MIN: Performed by: PODIATRIST

## 2023-08-04 PROCEDURE — 7100000011 HC PHASE II RECOVERY - ADDTL 15 MIN: Performed by: PODIATRIST

## 2023-08-04 PROCEDURE — 6360000002 HC RX W HCPCS

## 2023-08-04 PROCEDURE — 2500000003 HC RX 250 WO HCPCS

## 2023-08-04 PROCEDURE — 3600000003 HC SURGERY LEVEL 3 BASE: Performed by: PODIATRIST

## 2023-08-04 PROCEDURE — 3700000000 HC ANESTHESIA ATTENDED CARE: Performed by: PODIATRIST

## 2023-08-04 PROCEDURE — 2709999900 HC NON-CHARGEABLE SUPPLY: Performed by: PODIATRIST

## 2023-08-04 PROCEDURE — 88300 SURGICAL PATH GROSS: CPT

## 2023-08-04 PROCEDURE — 3700000001 HC ADD 15 MINUTES (ANESTHESIA): Performed by: PODIATRIST

## 2023-08-04 RX ORDER — OXYCODONE HYDROCHLORIDE 5 MG/1
10 TABLET ORAL PRN
Status: CANCELLED | OUTPATIENT
Start: 2023-08-04 | End: 2023-08-04

## 2023-08-04 RX ORDER — FENTANYL CITRATE 50 UG/ML
INJECTION, SOLUTION INTRAMUSCULAR; INTRAVENOUS PRN
Status: DISCONTINUED | OUTPATIENT
Start: 2023-08-04 | End: 2023-08-04 | Stop reason: SDUPTHER

## 2023-08-04 RX ORDER — MIDAZOLAM HYDROCHLORIDE 1 MG/ML
INJECTION INTRAMUSCULAR; INTRAVENOUS PRN
Status: DISCONTINUED | OUTPATIENT
Start: 2023-08-04 | End: 2023-08-04 | Stop reason: SDUPTHER

## 2023-08-04 RX ORDER — SODIUM CHLORIDE 0.9 % (FLUSH) 0.9 %
5-40 SYRINGE (ML) INJECTION EVERY 12 HOURS SCHEDULED
Status: DISCONTINUED | OUTPATIENT
Start: 2023-08-04 | End: 2023-08-04 | Stop reason: HOSPADM

## 2023-08-04 RX ORDER — SODIUM CHLORIDE 9 MG/ML
INJECTION, SOLUTION INTRAVENOUS CONTINUOUS PRN
Status: DISCONTINUED | OUTPATIENT
Start: 2023-08-04 | End: 2023-08-04 | Stop reason: SDUPTHER

## 2023-08-04 RX ORDER — SODIUM CHLORIDE 9 MG/ML
INJECTION, SOLUTION INTRAVENOUS PRN
Status: DISCONTINUED | OUTPATIENT
Start: 2023-08-04 | End: 2023-08-04 | Stop reason: HOSPADM

## 2023-08-04 RX ORDER — SODIUM CHLORIDE 0.9 % (FLUSH) 0.9 %
5-40 SYRINGE (ML) INJECTION EVERY 12 HOURS SCHEDULED
Status: CANCELLED | OUTPATIENT
Start: 2023-08-04

## 2023-08-04 RX ORDER — OXYCODONE HYDROCHLORIDE 5 MG/1
5 TABLET ORAL PRN
Status: CANCELLED | OUTPATIENT
Start: 2023-08-04 | End: 2023-08-04

## 2023-08-04 RX ORDER — BUPIVACAINE HYDROCHLORIDE 5 MG/ML
INJECTION, SOLUTION EPIDURAL; INTRACAUDAL PRN
Status: DISCONTINUED | OUTPATIENT
Start: 2023-08-04 | End: 2023-08-04 | Stop reason: ALTCHOICE

## 2023-08-04 RX ORDER — OXYCODONE HYDROCHLORIDE AND ACETAMINOPHEN 5; 325 MG/1; MG/1
1 TABLET ORAL EVERY 6 HOURS PRN
Qty: 20 TABLET | Refills: 0 | Status: SHIPPED | OUTPATIENT
Start: 2023-08-04 | End: 2023-08-09

## 2023-08-04 RX ORDER — SODIUM CHLORIDE 0.9 % (FLUSH) 0.9 %
5-40 SYRINGE (ML) INJECTION PRN
Status: CANCELLED | OUTPATIENT
Start: 2023-08-04

## 2023-08-04 RX ORDER — LIDOCAINE HYDROCHLORIDE 20 MG/ML
INJECTION, SOLUTION EPIDURAL; INFILTRATION; INTRACAUDAL; PERINEURAL PRN
Status: DISCONTINUED | OUTPATIENT
Start: 2023-08-04 | End: 2023-08-04 | Stop reason: SDUPTHER

## 2023-08-04 RX ORDER — SODIUM CHLORIDE 9 MG/ML
INJECTION, SOLUTION INTRAVENOUS PRN
Status: CANCELLED | OUTPATIENT
Start: 2023-08-04

## 2023-08-04 RX ORDER — SODIUM CHLORIDE 0.9 % (FLUSH) 0.9 %
5-40 SYRINGE (ML) INJECTION PRN
Status: DISCONTINUED | OUTPATIENT
Start: 2023-08-04 | End: 2023-08-04 | Stop reason: HOSPADM

## 2023-08-04 RX ORDER — PROPOFOL 10 MG/ML
INJECTION, EMULSION INTRAVENOUS PRN
Status: DISCONTINUED | OUTPATIENT
Start: 2023-08-04 | End: 2023-08-04 | Stop reason: SDUPTHER

## 2023-08-04 RX ADMIN — PROPOFOL 80 MCG/KG/MIN: 10 INJECTION, EMULSION INTRAVENOUS at 07:09

## 2023-08-04 RX ADMIN — FENTANYL CITRATE 25 MCG: 50 INJECTION, SOLUTION INTRAMUSCULAR; INTRAVENOUS at 07:16

## 2023-08-04 RX ADMIN — LIDOCAINE HYDROCHLORIDE 40 MG: 20 INJECTION, SOLUTION EPIDURAL; INFILTRATION; INTRACAUDAL; PERINEURAL at 07:08

## 2023-08-04 RX ADMIN — PROPOFOL 20 MG: 10 INJECTION, EMULSION INTRAVENOUS at 07:37

## 2023-08-04 RX ADMIN — WATER 2000 MG: 1 INJECTION INTRAMUSCULAR; INTRAVENOUS; SUBCUTANEOUS at 07:11

## 2023-08-04 RX ADMIN — FENTANYL CITRATE 25 MCG: 50 INJECTION, SOLUTION INTRAMUSCULAR; INTRAVENOUS at 07:39

## 2023-08-04 RX ADMIN — PROPOFOL 40 MG: 10 INJECTION, EMULSION INTRAVENOUS at 07:08

## 2023-08-04 RX ADMIN — MIDAZOLAM 2 MG: 1 INJECTION INTRAMUSCULAR; INTRAVENOUS at 07:00

## 2023-08-04 RX ADMIN — SODIUM CHLORIDE: 9 INJECTION, SOLUTION INTRAVENOUS at 07:00

## 2023-08-04 ASSESSMENT — LIFESTYLE VARIABLES: SMOKING_STATUS: 0

## 2023-08-04 ASSESSMENT — PAIN - FUNCTIONAL ASSESSMENT
PAIN_FUNCTIONAL_ASSESSMENT: 0-10
PAIN_FUNCTIONAL_ASSESSMENT: ACTIVITIES ARE NOT PREVENTED

## 2023-08-04 NOTE — DISCHARGE INSTRUCTIONS
Home Care    Follow these guidelines after surgery:   Rest. Try to move as tolerated. A mix of rest and light activity improves healing. The incision area may be sore for a few days. To minimize pain and soreness: Take pain medicine as directed. Avoid weight bearing to the operative extremity until cleared with physician    Diet    You can return to your regular diet. You may work with a dietician who will help you follow a heart-healthy diet. Physical Activity             Ask your doctor when you will be able to return to work. Do not drive unless your doctor has given you permission to do so. When taking medicines:   Take your medicine as directed. Do not change the amount or the schedule. Do not stop taking them without talking to your doctor. Do not share them. Know what results and side effects to look for. Report them to your doctor. Some drugs can be dangerous when mixed. Talk to a doctor or pharmacist if you are taking more than one drug. This includes over-the-counter medicines and herb or dietary supplements. Plan ahead for refills so you do not run out. Lifestyle Changes    You and your doctor will plan lifestyle changes that will help you recover. Some things to keep in mind include: Atherosclerosis and high blood pressure should be carefully managed. This can be done with medicines and a healthy lifestyle. If you smoke, talk to your doctor about quitting.      Follow-up   Call Your Doctor If Any of the Following Occurs   After you leave the hospital, call your doctor if any of the following occurs:   Redness, swelling, increasing pain, excessive bleeding, or discharge at the incision site   Signs of infection, including fever and chills   Any change of color or sensation in your legs or feet   Burning, pain, or other problems when urinating   Nausea or vomiting   Abdominal cramps or diarrhea   Unusual fatigue or depression   Disorientation or confusion   Numbness or

## 2023-08-04 NOTE — ANESTHESIA POSTPROCEDURE EVALUATION
Department of Anesthesiology  Postprocedure Note    Patient: Tiki West  MRN: [id number]  YOB: 1965  Date of evaluation: 8/4/2023      Procedure Summary     Date: 08/04/23 Room / Location: [facility] OR 07 / [facility]    Anesthesia Start: 0700 Anesthesia Stop: 6212    Procedure: REMOVAL PAINFUL HARDWARE RIGHT FOOT ++ARTHREX++ (Right: Foot) Diagnosis:       Pain in prosthetic joint, initial encounter ([address])      (Pain in prosthetic joint, initial encounter [facility]) [U01.07AJ])    Surgeons: Katherine Brothers DPM Responsible Provider: Mari Bowman DO    Anesthesia Type: MAC ASA Status: 3          Anesthesia Type: No value filed.     Lizbeth Phase I:      Lizbeth Phase II: Lizbeth Score: 10      Anesthesia Post Evaluation    Patient location during evaluation: PACU  Patient participation: complete - patient participated  Level of consciousness: awake and alert  Airway patency: patent  Nausea & Vomiting: no nausea and no vomiting  Complications: no  Cardiovascular status: hemodynamically stable  Respiratory status: acceptable  Hydration status: euvolemic  Pain management: adequate

## 2023-08-04 NOTE — PROGRESS NOTES
Discharge instructions provided to patient and   Both verbalize understanding  Patient has surgical shoe from home

## 2023-08-04 NOTE — BRIEF OP NOTE
Brief Postoperative Note      Patient: Puja Rizvi  YOB: 1965  MRN: [id number]    Date of Procedure: 8/4/2023    Pre-Op Diagnosis Codes:     * Pain in prosthetic joint, initial encounter ([address]) Samuel Pink    Post-Op Diagnosis: Same       Procedure(s):  REMOVAL PAINFUL HARDWARE RIGHT FOOT ++ARTHREX++    Surgeon(s):  Sahil Wall DPM    Assistant:  Resident: Blanca Hall DPM    Anesthesia: Monitor Anesthesia Care    Estimated Blood Loss (mL): less than 10 ccs    Complications: None    Specimens:   ID Type Source Tests Collected by Time Destination   A : [facility], [address], CHRISTIANNE 8/4/2023 0748        Implants:  * No implants in log *      Drains: * No LDAs found *    Findings: Consistent with diagnosis       Electronically signed by Moira Lindsay DPM on 8/4/2023 at 8:04 AM

## 2023-08-04 NOTE — OP NOTE
Operative Note      Patient: Blair Shay  YOB: 1965  MRN: [id number]    Date of Procedure: 8/4/2023    Pre-Op Diagnosis Codes:     * Pain in prosthetic joint, initial encounter ([address]) Muna Stadavin    Post-Op Diagnosis: Same       Procedure(s):  REMOVAL PAINFUL HARDWARE RIGHT FOOT ++ARTHREX++    Surgeon(s):  Nataly Krause DPM    Assistant:   Resident: Yunier Royal DPM    Anesthesia: Monitor Anesthesia Care    Estimated Blood Loss (mL): Less than 10 ccs    Complications: None    Specimens:   ID Type Source Tests Collected by Time Destination   A : [facility], [facility], [facility] 8/4/2023 0748        Implants:  * No implants in log *      Drains: * No LDAs found *    Findings: Consistent with diagnosis        Detailed Description of Procedure: This 60year old female was transported from the preoperative to the operating room and placed on the operating table in the supine position. Following the induction of local anesthesia and monitored anesthesia care, the right lower extremity was scrubbed,prepped and draped in the usual aseptic manner. The ankle tourniquet was then inflated and attention was directed to the dorsum of the right 2nd digit. An incision was carried down with a #15 blade until the plate and screws were visualized. Tissue was freed from the plate using a combination of rongeurs and osteotomes. Once the plate was fully visualized a screwdriver was used to remove all of the screws and subsequently the plate. Fluoroscopy was used to confirm successful removal of screws and plate. The surgical site was then flushed with copious amounts of normal saline and attention was directed towards closure. The site was closed using 3-0 vicryl and 4-0 stratafix. The right foot was then dressed with Jumpstart, 4x4s, kerlix, and ACE.  The patient tolerated the procedure and anesthesia well and was transferred to the PACU with vital signs stable and vascular status DM (diabetes mellitus)

## 2023-08-08 ENCOUNTER — OFFICE VISIT (OUTPATIENT)
Dept: PODIATRY | Age: 58
End: 2023-08-08

## 2023-08-08 VITALS — TEMPERATURE: 97 F | WEIGHT: 220 LBS | BODY MASS INDEX: 36.61 KG/M2

## 2023-08-08 DIAGNOSIS — Z09 POSTOPERATIVE EXAMINATION: Primary | ICD-10-CM

## 2023-08-08 LAB — SURGICAL PATHOLOGY REPORT: NORMAL

## 2023-08-08 PROCEDURE — 99024 POSTOP FOLLOW-UP VISIT: CPT | Performed by: PODIATRIST

## 2023-12-07 LAB — MAMMOGRAPHY, EXTERNAL: NORMAL

## 2024-03-06 ENCOUNTER — TELEPHONE (OUTPATIENT)
Dept: FAMILY MEDICINE CLINIC | Age: 59
End: 2024-03-06

## 2024-03-06 NOTE — TELEPHONE ENCOUNTER
Reached out to Marissa to schedule; no answer, LVM letting her know we could get her scheduled for an appt, but that Dr Mueller is not in the office on Thursdays as she requested. Provided our phone number for call back.   -----------------------------------------------------------  Appointment Request From: Marissa Dennis     With Provider: ISHA MUELLER MD [[facility]]     Preferred Date Range: 8/29/2024 - 8/29/2024     Preferred Times: Thursday Morning     Reason for visit: Office Visit     Comments:  Yearly med refill. Early am appt please on this specific date. Thanks.

## 2024-03-15 NOTE — TELEPHONE ENCOUNTER
Touched base with Marissa; she is going to check her schedule for her week of vacation in August to see if we can schedule with you then. Did advise her of 's schedule and when he was in office, so she will check her schedule and then [facility] us back [facility]

## 2024-06-11 DIAGNOSIS — I10 ESSENTIAL HYPERTENSION: ICD-10-CM

## 2024-06-11 RX ORDER — ATORVASTATIN CALCIUM 20 MG/1
TABLET, FILM COATED ORAL
Qty: 90 TABLET | Refills: 3 | Status: SHIPPED | OUTPATIENT
Start: 2024-06-11

## 2024-06-11 RX ORDER — LOSARTAN POTASSIUM AND HYDROCHLOROTHIAZIDE 25; 100 MG/1; MG/1
TABLET ORAL
Qty: 90 TABLET | Refills: 3 | Status: SHIPPED | OUTPATIENT
Start: 2024-06-11

## 2024-07-02 ASSESSMENT — PATIENT HEALTH QUESTIONNAIRE - PHQ9
2. FEELING DOWN, DEPRESSED OR HOPELESS: NOT AT ALL
SUM OF ALL RESPONSES TO PHQ9 QUESTIONS 1 & 2: 0
SUM OF ALL RESPONSES TO PHQ QUESTIONS 1-9: 0
2. FEELING DOWN, DEPRESSED OR HOPELESS: NOT AT ALL
1. LITTLE INTEREST OR PLEASURE IN DOING THINGS: NOT AT ALL
SUM OF ALL RESPONSES TO PHQ9 QUESTIONS 1 & 2: 0
SUM OF ALL RESPONSES TO PHQ QUESTIONS 1-9: 0
1. LITTLE INTEREST OR PLEASURE IN DOING THINGS: NOT AT ALL
SUM OF ALL RESPONSES TO PHQ QUESTIONS 1-9: 0
SUM OF ALL RESPONSES TO PHQ QUESTIONS 1-9: 0

## 2024-07-05 ENCOUNTER — OFFICE VISIT (OUTPATIENT)
Dept: FAMILY MEDICINE CLINIC | Age: 59
End: 2024-07-05
Payer: COMMERCIAL

## 2024-07-05 VITALS
WEIGHT: 225 LBS | BODY MASS INDEX: 37.49 KG/M2 | DIASTOLIC BLOOD PRESSURE: 68 MMHG | SYSTOLIC BLOOD PRESSURE: 122 MMHG | OXYGEN SATURATION: 98 % | HEIGHT: 65 IN | HEART RATE: 104 BPM | TEMPERATURE: 97.5 F

## 2024-07-05 DIAGNOSIS — Z00.00 ENCOUNTER FOR WELL ADULT EXAM WITHOUT ABNORMAL FINDINGS: Primary | ICD-10-CM

## 2024-07-05 DIAGNOSIS — I10 ESSENTIAL HYPERTENSION: ICD-10-CM

## 2024-07-05 PROCEDURE — 99396 PREV VISIT EST AGE 40-64: CPT | Performed by: FAMILY MEDICINE

## 2024-07-05 PROCEDURE — 3078F DIAST BP <80 MM HG: CPT | Performed by: FAMILY MEDICINE

## 2024-07-05 PROCEDURE — 3074F SYST BP LT 130 MM HG: CPT | Performed by: FAMILY MEDICINE

## 2024-07-05 NOTE — PROGRESS NOTES
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    Artificial joint pain (HCC) T84.84XA     _________________________________________________________  Past Medical History:   Diagnosis Date    Hyperlipidemia     Hypertension     Prolonged emergence from general anesthesia     Right foot pain        Family History   Problem Relation Age of Onset    Hypertension Father     Heart Attack Father     Stroke Mother     Hypertension Mother     Cancer Brother         renal    Kidney Cancer Brother     Thyroid Disease Sister     Diabetes Brother     Thyroid Disease Brother        Past Surgical History:   Procedure Laterality Date    ARTHRODESIS Right 09/24/2021    ARTHRODESIS SECOND MPJ RIGHT FOOT ARTHRODESIS SECOND IPJ RIGHT FOOT performed by Chris Dominguez DPM at [facility] OR    BREAST REDUCTION SURGERY Bilateral 03/25/2019    BREAST REDUCTION SURGERY Bilateral 03/25/2019    BILATERAL BREAST REDUCTION performed by Shashank Christine MD at [facility] OR    COLONOSCOPY      ENDOMETRIAL ABLATION  2005    FOOT SURGERY Right     x2    FOOT SURGERY Right 11/26/2021    OPEN REDUCTION INTERNAL FIXATION WITH PLATE SECOND METATARSAL FRACTURE RIGHT FOOT performed by Chris Dominguez DPM at [facility] OR    FOOT SURGERY Right 8/4/2023    REMOVAL PAINFUL HARDWARE RIGHT FOOT performed by Chris Dominguez DPM at [facility] OR    LAPAROSCOPY  2007    right ovarian cyst    LAPAROSCOPY  2000    right tubal preg

## 2024-07-11 DIAGNOSIS — I10 ESSENTIAL HYPERTENSION: ICD-10-CM

## 2024-07-11 LAB
ALBUMIN: 4.2 G/DL (ref 3.5–5.2)
ALP BLD-CCNC: 73 U/L (ref 35–104)
ALT SERPL-CCNC: 27 U/L (ref 0–32)
ANION GAP SERPL CALCULATED.3IONS-SCNC: 11 MMOL/L (ref 7–16)
AST SERPL-CCNC: 22 U/L (ref 0–31)
BASOPHILS ABSOLUTE: 0.03 K/UL (ref 0–0.2)
BASOPHILS RELATIVE PERCENT: 1 % (ref 0–2)
BILIRUB SERPL-MCNC: 0.7 MG/DL (ref 0–1.2)
BUN BLDV-MCNC: 19 MG/DL (ref 6–23)
CALCIUM SERPL-MCNC: 10 MG/DL (ref 8.6–10.2)
CHLORIDE BLD-SCNC: 102 MMOL/L (ref 98–107)
CHOLESTEROL, TOTAL: 183 MG/DL
CO2: 28 MMOL/L (ref 22–29)
CREAT SERPL-MCNC: 0.8 MG/DL (ref 0.5–1)
EOSINOPHILS ABSOLUTE: 0.18 K/UL (ref 0.05–0.5)
EOSINOPHILS RELATIVE PERCENT: 3 % (ref 0–6)
GFR, ESTIMATED: 83 ML/MIN/1.73M2
GLUCOSE BLD-MCNC: 93 MG/DL (ref 74–99)
HCT VFR BLD CALC: 42.8 % (ref 34–48)
HDLC SERPL-MCNC: 39 MG/DL
HEMOGLOBIN: 14.2 G/DL (ref 11.5–15.5)
IMMATURE GRANULOCYTES %: 0 % (ref 0–5)
IMMATURE GRANULOCYTES ABSOLUTE: <0.03 K/UL (ref 0–0.58)
LDL CHOLESTEROL: 99 MG/DL
LYMPHOCYTES ABSOLUTE: 1.63 K/UL (ref 1.5–4)
LYMPHOCYTES RELATIVE PERCENT: 29 % (ref 20–42)
MCH RBC QN AUTO: 30.1 PG (ref 26–35)
MCHC RBC AUTO-ENTMCNC: 33.2 G/DL (ref 32–34.5)
MCV RBC AUTO: 90.7 FL (ref 80–99.9)
MONOCYTES ABSOLUTE: 0.38 K/UL (ref 0.1–0.95)
MONOCYTES RELATIVE PERCENT: 7 % (ref 2–12)
NEUTROPHILS ABSOLUTE: 3.49 K/UL (ref 1.8–7.3)
NEUTROPHILS RELATIVE PERCENT: 61 % (ref 43–80)
PDW BLD-RTO: 13.1 % (ref 11.5–15)
PLATELET # BLD: 247 K/UL (ref 130–450)
PMV BLD AUTO: 10.8 FL (ref 7–12)
POTASSIUM SERPL-SCNC: 3.9 MMOL/L (ref 3.5–5)
RBC # BLD: 4.72 M/UL (ref 3.5–5.5)
SODIUM BLD-SCNC: 141 MMOL/L (ref 132–146)
TOTAL PROTEIN: 7.3 G/DL (ref 6.4–8.3)
TRIGL SERPL-MCNC: 227 MG/DL
VLDLC SERPL CALC-MCNC: 45 MG/DL
WBC # BLD: 5.7 K/UL (ref 4.5–11.5)

## 2024-09-23 ENCOUNTER — OFFICE VISIT (OUTPATIENT)
Dept: FAMILY MEDICINE CLINIC | Age: 59
End: 2024-09-23
Payer: COMMERCIAL

## 2024-09-23 VITALS
TEMPERATURE: 97.4 F | SYSTOLIC BLOOD PRESSURE: 134 MMHG | WEIGHT: 225 LBS | BODY MASS INDEX: 37.49 KG/M2 | HEIGHT: 65 IN | DIASTOLIC BLOOD PRESSURE: 80 MMHG | HEART RATE: 72 BPM | OXYGEN SATURATION: 96 %

## 2024-09-23 DIAGNOSIS — R11.0 NAUSEA: ICD-10-CM

## 2024-09-23 DIAGNOSIS — R10.13 EPIGASTRIC PAIN: Primary | ICD-10-CM

## 2024-09-23 DIAGNOSIS — R68.81 EARLY SATIETY: ICD-10-CM

## 2024-09-23 PROCEDURE — 3075F SYST BP GE 130 - 139MM HG: CPT | Performed by: FAMILY MEDICINE

## 2024-09-23 PROCEDURE — 99214 OFFICE O/P EST MOD 30 MIN: CPT | Performed by: FAMILY MEDICINE

## 2024-09-23 PROCEDURE — 3079F DIAST BP 80-89 MM HG: CPT | Performed by: FAMILY MEDICINE

## 2024-09-23 RX ORDER — OMEPRAZOLE 40 MG/1
40 CAPSULE, DELAYED RELEASE ORAL
Qty: 14 CAPSULE | Refills: 0 | Status: SHIPPED | OUTPATIENT
Start: 2024-09-23

## 2024-09-23 RX ORDER — METOCLOPRAMIDE 5 MG/1
5 TABLET ORAL 3 TIMES DAILY
Qty: 120 TABLET | Refills: 3 | Status: CANCELLED | OUTPATIENT
Start: 2024-09-23

## 2024-09-23 RX ORDER — PANTOPRAZOLE SODIUM 40 MG/1
40 TABLET, DELAYED RELEASE ORAL
Qty: 90 TABLET | Refills: 1 | Status: CANCELLED | OUTPATIENT
Start: 2024-09-23

## 2024-09-23 RX ORDER — ONDANSETRON 4 MG/1
4 TABLET, ORALLY DISINTEGRATING ORAL 3 TIMES DAILY PRN
Qty: 21 TABLET | Refills: 0 | Status: SHIPPED | OUTPATIENT
Start: 2024-09-23

## 2024-09-24 ENCOUNTER — TELEPHONE (OUTPATIENT)
Dept: FAMILY MEDICINE CLINIC | Age: 59
End: 2024-09-24

## 2024-09-24 DIAGNOSIS — R68.81 EARLY SATIETY: ICD-10-CM

## 2024-09-24 DIAGNOSIS — R10.13 EPIGASTRIC PAIN: ICD-10-CM

## 2024-09-24 DIAGNOSIS — R10.13 EPIGASTRIC PAIN: Primary | ICD-10-CM

## 2024-09-24 DIAGNOSIS — R11.0 NAUSEA: ICD-10-CM

## 2024-09-24 LAB
ALBUMIN: 4.6 G/DL (ref 3.5–5.2)
ALP BLD-CCNC: 68 U/L (ref 35–104)
ALT SERPL-CCNC: 38 U/L (ref 0–32)
ANION GAP SERPL CALCULATED.3IONS-SCNC: 17 MMOL/L (ref 7–16)
AST SERPL-CCNC: 30 U/L (ref 0–31)
BASOPHILS ABSOLUTE: 0.03 K/UL (ref 0–0.2)
BASOPHILS RELATIVE PERCENT: 1 % (ref 0–2)
BILIRUB SERPL-MCNC: 0.8 MG/DL (ref 0–1.2)
BUN BLDV-MCNC: 16 MG/DL (ref 6–23)
CALCIUM SERPL-MCNC: 9.7 MG/DL (ref 8.6–10.2)
CHLORIDE BLD-SCNC: 101 MMOL/L (ref 98–107)
CO2: 25 MMOL/L (ref 22–29)
CREAT SERPL-MCNC: 0.9 MG/DL (ref 0.5–1)
EOSINOPHILS ABSOLUTE: 0.18 K/UL (ref 0.05–0.5)
EOSINOPHILS RELATIVE PERCENT: 3 % (ref 0–6)
GFR, ESTIMATED: 72 ML/MIN/1.73M2
GLUCOSE BLD-MCNC: 89 MG/DL (ref 74–99)
HCT VFR BLD CALC: 41.7 % (ref 34–48)
HEMOGLOBIN: 13.7 G/DL (ref 11.5–15.5)
IMMATURE GRANULOCYTES %: 0 % (ref 0–5)
IMMATURE GRANULOCYTES ABSOLUTE: <0.03 K/UL (ref 0–0.58)
LIPASE: 30 U/L (ref 13–60)
LYMPHOCYTES ABSOLUTE: 1.78 K/UL (ref 1.5–4)
LYMPHOCYTES RELATIVE PERCENT: 32 % (ref 20–42)
MCH RBC QN AUTO: 30.1 PG (ref 26–35)
MCHC RBC AUTO-ENTMCNC: 32.9 G/DL (ref 32–34.5)
MCV RBC AUTO: 91.6 FL (ref 80–99.9)
MONOCYTES ABSOLUTE: 0.42 K/UL (ref 0.1–0.95)
MONOCYTES RELATIVE PERCENT: 7 % (ref 2–12)
NEUTROPHILS ABSOLUTE: 3.21 K/UL (ref 1.8–7.3)
NEUTROPHILS RELATIVE PERCENT: 57 % (ref 43–80)
PDW BLD-RTO: 13.9 % (ref 11.5–15)
PLATELET # BLD: 245 K/UL (ref 130–450)
PMV BLD AUTO: 10.5 FL (ref 7–12)
POTASSIUM SERPL-SCNC: 3.6 MMOL/L (ref 3.5–5)
RBC # BLD: 4.55 M/UL (ref 3.5–5.5)
SODIUM BLD-SCNC: 143 MMOL/L (ref 132–146)
TOTAL PROTEIN: 7.4 G/DL (ref 6.4–8.3)
WBC # BLD: 5.6 K/UL (ref 4.5–11.5)

## 2024-10-03 ENCOUNTER — PATIENT MESSAGE (OUTPATIENT)
Dept: FAMILY MEDICINE CLINIC | Age: 59
End: 2024-10-03

## 2024-10-03 RX ORDER — OMEPRAZOLE 40 MG/1
40 CAPSULE, DELAYED RELEASE ORAL
Qty: 30 CAPSULE | Refills: 1 | Status: SHIPPED | OUTPATIENT
Start: 2024-10-03

## 2024-11-27 RX ORDER — OMEPRAZOLE 40 MG/1
40 CAPSULE, DELAYED RELEASE ORAL
Qty: 90 CAPSULE | Refills: 1 | Status: SHIPPED | OUTPATIENT
Start: 2024-11-27

## 2024-11-27 NOTE — TELEPHONE ENCOUNTER
Name of Medication(s) Requested:  Requested Prescriptions     Pending Prescriptions Disp Refills    omeprazole (PRILOSEC) 40 MG delayed release capsule [Pharmacy Med Name: omeprazole 40 mg capsule,delayed release] 90 capsule      Sig: TAKE ONE CAPSULE BY MOUTH EVERY MORNING BEFORE BREAKFAST       Medication is on current medication list Yes    Dosage and directions were verified? Yes    Quantity verified: 90 day supply     Pharmacy Verified?  Yes    Last Appointment:  9/23/2024    Future appts:  No future appointments.     (If no appt send self scheduling link. .REFILLAPPT)  Scheduling request sent?     [] Yes  [x] No    Does patient need updated?  [] Yes  [x] No

## 2024-12-19 LAB — MAMMOGRAPHY, EXTERNAL: NEGATIVE

## 2025-02-05 NOTE — TELEPHONE ENCOUNTER
Patient has changed pharmacies. [facility] is asking  resend them directly today.   rxs pended [Follow-Up Visit] : a follow-up visit for [Blood Count Assessment] : blood count assessment [Family Member] : family member [Other: _____] : [unfilled] [FreeTextEntry2] : Anemia STEVE

## 2025-03-06 ENCOUNTER — PATIENT MESSAGE (OUTPATIENT)
Dept: FAMILY MEDICINE CLINIC | Age: 60
End: 2025-03-06

## 2025-03-06 DIAGNOSIS — E66.812 CLASS 2 OBESITY DUE TO EXCESS CALORIES WITH BODY MASS INDEX (BMI) OF 37.0 TO 37.9 IN ADULT, UNSPECIFIED WHETHER SERIOUS COMORBIDITY PRESENT: Primary | ICD-10-CM

## 2025-03-06 DIAGNOSIS — E66.09 CLASS 2 OBESITY DUE TO EXCESS CALORIES WITH BODY MASS INDEX (BMI) OF 37.0 TO 37.9 IN ADULT, UNSPECIFIED WHETHER SERIOUS COMORBIDITY PRESENT: Primary | ICD-10-CM

## 2025-05-28 RX ORDER — OMEPRAZOLE 40 MG/1
40 CAPSULE, DELAYED RELEASE ORAL
Qty: 90 CAPSULE | Refills: 1 | Status: SHIPPED | OUTPATIENT
Start: 2025-05-28

## 2025-05-28 NOTE — TELEPHONE ENCOUNTER
Name of Medication(s) Requested:  Requested Prescriptions     Pending Prescriptions Disp Refills    omeprazole (PRILOSEC) 40 MG delayed release capsule [Pharmacy Med Name: omeprazole 40 mg capsule,delayed release] 90 capsule 1     Sig: TAKE ONE CAPSULE BY MOUTH EVERY MORNING before BREAKFAST       Medication is on current medication list Yes    Dosage and directions were verified? Yes    Quantity verified: 90 day supply     Pharmacy Verified?  Yes    Last Appointment:  9/23/2024    Future appts:  Future Appointments   Date Time Provider Department Center   7/15/2025  1:00 PM Segundo Mueller MD [facility] DE[facility]        (If no appt send self scheduling link. .REFILLAPPT)  Scheduling request sent?     [] Yes  [x] No    Does patient need updated?  [] Yes  [x] No

## 2025-06-12 DIAGNOSIS — I10 ESSENTIAL HYPERTENSION: ICD-10-CM

## 2025-06-12 RX ORDER — LOSARTAN POTASSIUM AND HYDROCHLOROTHIAZIDE 25; 100 MG/1; MG/1
1 TABLET ORAL DAILY
Qty: 90 TABLET | Refills: 3 | Status: SHIPPED | OUTPATIENT
Start: 2025-06-12

## 2025-06-12 NOTE — TELEPHONE ENCOUNTER
Name of Medication(s) Requested:  Requested Prescriptions     Pending Prescriptions Disp Refills    losartan-hydroCHLOROthiazide (HYZAAR) 100-25 MG per tablet [Pharmacy Med Name: losartan 100 mg-hydrochlorothiazide 25 mg tablet] 90 tablet 3     Sig: TAKE ONE TABLET BY MOUTH ONCE DAILY       Medication is on current medication list Yes    Dosage and directions were verified? Yes    Quantity verified: 90 day supply     Pharmacy Verified?  Yes    Last Appointment:  9/23/2024    Future appts:  Future Appointments   Date Time Provider Department Center   7/15/2025  1:00 PM Segundo Mueller MD [facility] DEP        (If no appt send self scheduling link. .REFILLAPPT)  Scheduling request sent?     [] Yes  [x] No    Does patient need updated?  [] Yes  [x] No

## 2025-06-23 RX ORDER — ATORVASTATIN CALCIUM 20 MG/1
20 TABLET, FILM COATED ORAL DAILY
Qty: 90 TABLET | Refills: 3 | Status: SHIPPED | OUTPATIENT
Start: 2025-06-23

## 2025-06-23 NOTE — TELEPHONE ENCOUNTER
Last Appointment:  9/23/2024  Future Appointments   Date Time Provider Department Center   7/15/2025  1:00 PM Segundo Mueller MD [facility] DEP

## 2025-06-25 DIAGNOSIS — I10 ESSENTIAL HYPERTENSION: ICD-10-CM

## 2025-06-26 RX ORDER — LOSARTAN POTASSIUM AND HYDROCHLOROTHIAZIDE 25; 100 MG/1; MG/1
1 TABLET ORAL DAILY
Qty: 90 TABLET | Refills: 3 | OUTPATIENT
Start: 2025-06-26

## 2025-06-26 RX ORDER — ATORVASTATIN CALCIUM 20 MG/1
20 TABLET, FILM COATED ORAL DAILY
Qty: 90 TABLET | Refills: 3 | OUTPATIENT
Start: 2025-06-26

## 2025-07-12 ASSESSMENT — PATIENT HEALTH QUESTIONNAIRE - PHQ9
2. FEELING DOWN, DEPRESSED OR HOPELESS: NOT AT ALL
SUM OF ALL RESPONSES TO PHQ QUESTIONS 1-9: 0
SUM OF ALL RESPONSES TO PHQ9 QUESTIONS 1 & 2: 0
1. LITTLE INTEREST OR PLEASURE IN DOING THINGS: NOT AT ALL
1. LITTLE INTEREST OR PLEASURE IN DOING THINGS: NOT AT ALL
SUM OF ALL RESPONSES TO PHQ QUESTIONS 1-9: 0
2. FEELING DOWN, DEPRESSED OR HOPELESS: NOT AT ALL
SUM OF ALL RESPONSES TO PHQ QUESTIONS 1-9: 0
SUM OF ALL RESPONSES TO PHQ QUESTIONS 1-9: 0

## 2025-07-15 ENCOUNTER — OFFICE VISIT (OUTPATIENT)
Dept: FAMILY MEDICINE CLINIC | Age: 60
End: 2025-07-15
Payer: COMMERCIAL

## 2025-07-15 VITALS
HEIGHT: 65 IN | DIASTOLIC BLOOD PRESSURE: 82 MMHG | BODY MASS INDEX: 36.99 KG/M2 | HEART RATE: 93 BPM | OXYGEN SATURATION: 97 % | WEIGHT: 222 LBS | TEMPERATURE: 97.9 F | SYSTOLIC BLOOD PRESSURE: 126 MMHG

## 2025-07-15 DIAGNOSIS — Z00.00 ENCOUNTER FOR WELL ADULT EXAM WITHOUT ABNORMAL FINDINGS: ICD-10-CM

## 2025-07-15 DIAGNOSIS — I10 ESSENTIAL HYPERTENSION: Primary | ICD-10-CM

## 2025-07-15 PROCEDURE — 99396 PREV VISIT EST AGE 40-64: CPT | Performed by: FAMILY MEDICINE

## 2025-07-15 PROCEDURE — 3074F SYST BP LT 130 MM HG: CPT | Performed by: FAMILY MEDICINE

## 2025-07-15 PROCEDURE — 3079F DIAST BP 80-89 MM HG: CPT | Performed by: FAMILY MEDICINE

## 2025-07-15 NOTE — PROGRESS NOTES
[facility]  Office Progress Note - Dr. Mueller  7/15/25    CC:   Chief Complaint   Patient presents with    Annual Exam     Skin problem - red dots on tops of feet.        /82   Pulse 93   Temp 97.9 °F (36.6 °C) (Temporal)   Ht 1.651 m (5' 5\")   Wt 100.7 kg (222 lb)   LMP  (LMP Unknown)   SpO2 97%   BMI 36.94 kg/m²   Wt Readings from Last 3 Encounters:   07/15/25 100.7 kg (222 lb)   09/23/24 102.1 kg (225 lb)   07/05/24 102.1 kg (225 lb)       HPI:   YOKASTA Generated Note:  History of Present Illness  The patient presents for a well visit.    She reports no new health concerns. She has been on a temporary steroid regimen due to inflammation in her trochanteric bursa, a condition she has experienced before. The steroids took a little longer to kick in but made her feel significantly better, including her knees. However, she notes that her knees are not feeling as good now. She had previously attributed her knee pain to aging and having a higher body weight. Her hip condition has improved. She used to receive injections at a pain center.    She has observed a red spot on her toe, which she believes to be petechiae. This spot is present on both feet, with the one on the right foot being smaller. She reports no pain or itching associated with these spots, which appeared suddenly about a week ago without any injury. Initially, she thought it was a blister when she saw it in the shower, but later realized it was not. She has neuropathy.    She has been proactive in managing her weight and has successfully lost over 10 pounds. She has scheduled her mammogram for 12/2025. She had a colonoscopy in 2017 with Dr. Pan and needs to find a new provider as he is no longer performing them. She reports no chest pain, breathing difficulties, abdominal pain, changes in bowel or bladder habits, hearing changes, or dental issues. She does report vision changes and sees an ophthalmologist annually.

## 2025-08-01 DIAGNOSIS — I10 ESSENTIAL HYPERTENSION: ICD-10-CM

## 2025-08-01 LAB
ALBUMIN: 4 G/DL (ref 3.5–5.2)
ALP BLD-CCNC: 68 U/L (ref 35–104)
ALT SERPL-CCNC: 33 U/L (ref 0–35)
ANION GAP SERPL CALCULATED.3IONS-SCNC: 12 MMOL/L (ref 7–16)
AST SERPL-CCNC: 32 U/L (ref 0–35)
BASOPHILS ABSOLUTE: 0.03 K/UL (ref 0–0.2)
BASOPHILS RELATIVE PERCENT: 1 % (ref 0–2)
BILIRUB SERPL-MCNC: 0.6 MG/DL (ref 0–1.2)
BUN BLDV-MCNC: 20 MG/DL (ref 8–23)
CALCIUM SERPL-MCNC: 9.6 MG/DL (ref 8.8–10.2)
CHLORIDE BLD-SCNC: 104 MMOL/L (ref 98–107)
CHOLESTEROL, TOTAL: 170 MG/DL
CO2: 25 MMOL/L (ref 22–29)
CREAT SERPL-MCNC: 0.8 MG/DL (ref 0.5–1)
EOSINOPHILS ABSOLUTE: 0.11 K/UL (ref 0.05–0.5)
EOSINOPHILS RELATIVE PERCENT: 2 % (ref 0–6)
GFR, ESTIMATED: 80 ML/MIN/1.73M2
GLUCOSE BLD-MCNC: 98 MG/DL (ref 74–99)
HCT VFR BLD CALC: 41 % (ref 34–48)
HDLC SERPL-MCNC: 36 MG/DL
HEMOGLOBIN: 13.6 G/DL (ref 11.5–15.5)
IMMATURE GRANULOCYTES %: 0 % (ref 0–5)
IMMATURE GRANULOCYTES ABSOLUTE: <0.03 K/UL (ref 0–0.58)
LDL CHOLESTEROL: 92 MG/DL
LYMPHOCYTES ABSOLUTE: 1.47 K/UL (ref 1.5–4)
LYMPHOCYTES RELATIVE PERCENT: 26 % (ref 20–42)
MCH RBC QN AUTO: 30 PG (ref 26–35)
MCHC RBC AUTO-ENTMCNC: 33.2 G/DL (ref 32–34.5)
MCV RBC AUTO: 90.3 FL (ref 80–99.9)
MONOCYTES ABSOLUTE: 0.49 K/UL (ref 0.1–0.95)
MONOCYTES RELATIVE PERCENT: 9 % (ref 2–12)
NEUTROPHILS ABSOLUTE: 3.51 K/UL (ref 1.8–7.3)
NEUTROPHILS RELATIVE PERCENT: 62 % (ref 43–80)
PDW BLD-RTO: 13.5 % (ref 11.5–15)
PLATELET # BLD: 240 K/UL (ref 130–450)
PMV BLD AUTO: 10.9 FL (ref 7–12)
POTASSIUM SERPL-SCNC: 3.7 MMOL/L (ref 3.5–5.1)
RBC # BLD: 4.54 M/UL (ref 3.5–5.5)
SODIUM BLD-SCNC: 140 MMOL/L (ref 136–145)
TOTAL PROTEIN: 6.6 G/DL (ref 6.4–8.3)
TRIGL SERPL-MCNC: 213 MG/DL
VLDLC SERPL CALC-MCNC: 43 MG/DL
WBC # BLD: 5.6 K/UL (ref 4.5–11.5)

## (undated) DEVICE — HANDLE CVR PATENTED RETENTION DISC STRL LIGHT SHLD

## (undated) DEVICE — GLOVE ORANGE PI 8   MSG9080

## (undated) DEVICE — MARKER,SKIN,WI/RULER AND LABELS: Brand: MEDLINE

## (undated) DEVICE — 4-PORT MANIFOLD: Brand: NEPTUNE 2

## (undated) DEVICE — SYRINGE BLB 50CC IRRIG PLIABLE FNGR FLNG GRAD FLSK DISP

## (undated) DEVICE — ZIMMER® STERILE DISPOSABLE TOURNIQUET CUFF WITH PLC, DUAL PORT, SINGLE BLADDER, 18 IN. (46 CM)

## (undated) DEVICE — CHLORAPREP 26ML ORANGE

## (undated) DEVICE — SOLUTION IRRIG 1000ML 09% SOD CHL USP PIC PLAS CONTAINER

## (undated) DEVICE — SYRINGE MED 50ML LUERLOCK TIP

## (undated) DEVICE — INSTRUMENT CURRETTES REUSABLE

## (undated) DEVICE — GAUZE,SPONGE,4"X4",8PLY,STRL,LF,10/TRAY: Brand: MEDLINE

## (undated) DEVICE — DRESSING ALG W2XL5IN ANTIMIC WND JUMPSTART

## (undated) DEVICE — SOLUTION IV IRRIG WATER 1000ML POUR BRL 2F7114

## (undated) DEVICE — SET PSI

## (undated) DEVICE — BANDAGE,GAUZE,CONFORMING,4"X75",STRL,LF: Brand: MEDLINE

## (undated) DEVICE — DOUBLE BASIN SET: Brand: MEDLINE INDUSTRIES, INC.

## (undated) DEVICE — TRAY DRILL SYSTEM 4 REUSABLE

## (undated) DEVICE — MEDI-VAC YANKAUER SUCTION HANDLE W/BULBOUS TIP: Brand: CARDINAL HEALTH

## (undated) DEVICE — DRAPE,EXTREMITY,89X128,STERILE: Brand: MEDLINE

## (undated) DEVICE — BNDG,ELSTC,MATRIX,STRL,3"X5YD,LF,HOOK&LP: Brand: MEDLINE

## (undated) DEVICE — SUTURE STRATAFIX SPRL MCRYL + SZ 4-0 L12IN ABSRB UD PS-2 SXMP1B117

## (undated) DEVICE — NEEDLE,22GX1.5",REG,BEVEL: Brand: MEDLINE

## (undated) DEVICE — GOWN,SIRUS,FABRNF,XL,20/CS: Brand: MEDLINE

## (undated) DEVICE — 3M™ COBAN™ NL STERILE NON-LATEX SELF-ADHERENT WRAP, 2084S, 4 IN X 5 YD (10 CM X 4,5 M), 18 ROLLS/CASE: Brand: 3M™ COBAN™

## (undated) DEVICE — INTENDED FOR TISSUE SEPARATION, AND OTHER PROCEDURES THAT REQUIRE A SHARP SURGICAL BLADE TO PUNCTURE OR CUT.: Brand: BARD-PARKER ® STAINLESS STEEL BLADES

## (undated) DEVICE — STERILE LATEX POWDER-FREE SURGICAL GLOVESWITH NITRILE COATING: Brand: PROTEXIS

## (undated) DEVICE — SET ORTHO STD STORTSTD1

## (undated) DEVICE — Device

## (undated) DEVICE — BANDAGE,GAUZE,BULKEE II,4.5"X4.1YD,STRL: Brand: MEDLINE

## (undated) DEVICE — SYRINGE MED 30ML STD CLR PLAS LUERLOCK TIP N CTRL DISP

## (undated) DEVICE — GARMENT COMPR STD FOR 17IN CALF UNIF THER FLOTRN

## (undated) DEVICE — BANDAGE,GAUZE,CONFORMING,3"X75",STRL,LF: Brand: MEDLINE

## (undated) DEVICE — NEEDLE HYPO 18GA L1.5IN PNK POLYPR HUB S STL THN WALL FILL

## (undated) DEVICE — CONTROL SYRINGE LUER-LOCK TIP: Brand: MONOJECT

## (undated) DEVICE — DRESSING PETRO W3XL8IN OIL EMUL N ADH GZ KNIT IMPREG CELOS

## (undated) DEVICE — PACK PROCEDURE SURG GEN CUST

## (undated) DEVICE — BNDG,ELSTC,MATRIX,STRL,4"X5YD,LF,HOOK&LP: Brand: MEDLINE

## (undated) DEVICE — BLADE,STAINLESS-STEEL,15,STRL,DISPOSABLE: Brand: MEDLINE

## (undated) DEVICE — PLASMABLADE PS210-030S 3.0S LOCK: Brand: PLASMABLADE™

## (undated) DEVICE — TOWEL,OR,DSP,ST,BLUE,STD,6/PK,12PK/CS: Brand: MEDLINE

## (undated) DEVICE — UNIVERSAL PACK: Brand: CONVERTORS

## (undated) DEVICE — SYSTEM TPS ORTHO

## (undated) DEVICE — AGENT HEMSTAT 5GM ARISTA AH

## (undated) DEVICE — SOLUTION IV IRRIG POUR BRL 0.9% SODIUM CHL 2F7124

## (undated) DEVICE — ELECTRODE PT RET AD L9FT HI MOIST COND ADH HYDRGEL CORDED

## (undated) DEVICE — DRAPE CARM MINI FOR IMAG SYS INSIGHT FLROSCN

## (undated) DEVICE — 12 ML SYRINGE,LUER-LOCK TIP: Brand: MONOJECT

## (undated) DEVICE — HALF SHEET: Brand: CONVERTORS

## (undated) DEVICE — SYRINGE,CONTROL,LL,FINGER,GRIP: Brand: MEDLINE INDUSTRIES, INC.

## (undated) DEVICE — TUBING SUCT 12FR MAL ALUM SHFT FN CAP VENT UNIV CONN W/ OBT

## (undated) DEVICE — PADDING,UNDERCAST,COTTON, 4"X4YD STERILE: Brand: MEDLINE

## (undated) DEVICE — DRESSING,GAUZE,PETROLATUM,CURAD,1"X8",ST: Brand: CURAD

## (undated) DEVICE — BANDAGE COMPR W6INXL10YD ST M E WHITE/BEIGE

## (undated) DEVICE — COVER HNDL LT DISP

## (undated) DEVICE — NEEDLE HYPO 22GA L1.5IN BLK POLYPR HUB S STL REG BVL STR

## (undated) DEVICE — MEDI-VAC NON-CONDUCTIVE SUCTION TUBING: Brand: CARDINAL HEALTH

## (undated) DEVICE — INSTRUMENT SYSTEM 4 BATTERY REUSABLE

## (undated) DEVICE — DRAPE C ARM W41XL74IN UNIV MOB W RUBBERBAND CLP

## (undated) DEVICE — SHOE POSTOP L WOMAN 8-10 UNIV FOAM TRICOT SEMI FLX SKID

## (undated) DEVICE — PENCIL ELECSURG 9.5 MMX3 M 22 MM MOLD PLUMEPEN ELITE

## (undated) DEVICE — 3M™ STERI-STRIP™ REINFORCED ADHESIVE SKIN CLOSURES, R1548, 1 IN X 5 IN (25 MM X 125 MM), 4 STRIPS/ENVELOPE: Brand: 3M™ STERI-STRIP™

## (undated) DEVICE — Y-TYPE TUR IRRIGATION SET

## (undated) DEVICE — C-ARM: Brand: UNBRANDED

## (undated) DEVICE — PADDING CAST W6INXL4YD COT LO LINTING WYTEX

## (undated) DEVICE — STANDARD SURGICAL GOWN, L: Brand: CONVERTORS

## (undated) DEVICE — PADDING UNDERCAST W6INXL4YD WYTEX 6 PER BG

## (undated) DEVICE — ADHESIVE SKIN CLSR 0.7ML SGL PEEL PCH GEL WTRPRF FOR WOUNDS

## (undated) DEVICE — SUTURE MCRYL SZ 4-0 L18IN ABSRB UD L19MM PS-2 3/8 CIR PRIM Y496G

## (undated) DEVICE — PEN: MARKING STD 100/CS: Brand: MEDICAL ACTION INDUSTRIES

## (undated) DEVICE — BIT DRL 1.5 MM

## (undated) DEVICE — APPLICATOR MEDICATED 26 CC SOLUTION HI LT ORNG CHLORAPREP

## (undated) DEVICE — TRAY LAMBOTS REUSABLE

## (undated) DEVICE — BLADE,STAINLESS-STEEL,10,STRL,DISPOSABLE: Brand: MEDLINE

## (undated) DEVICE — SYRINGE MED 10ML TRNSLUC BRL PLUNG BLK MRK POLYPR CTRL

## (undated) DEVICE — K WIRE FIX L6IN DIA0.045IN 1600645] MICROAIRE SURGICAL INSTRUMENTS INC]
Type: IMPLANTABLE DEVICE | Status: NON-FUNCTIONAL
Removed: 2021-11-26

## (undated) DEVICE — SUTURE MCRYL SZ 3-0 L27IN ABSRB UD L26MM SH 1/2 CIR Y416H

## (undated) DEVICE — K WIRE FIX L6IN DIA0.045IN 1600645] MICROAIRE SURGICAL INSTRUMENTS INC]
Type: IMPLANTABLE DEVICE | Status: NON-FUNCTIONAL
Removed: 2021-09-24

## (undated) DEVICE — SPONGE,LAP,12"X12",XR,ST,5/PK,40PK/CS: Brand: MEDLINE

## (undated) DEVICE — Z DISCONTINUED USE 2219801 STAPLER SKIN REG CRWN L5.7MM LEG L3.9MM WIRE DIA0.53MM PROX

## (undated) DEVICE — NEEDLE HYPO 25GA L1.5IN BLU POLYPR HUB S STL REG BVL STR

## (undated) DEVICE — BLADE ES ELASTOMERIC COAT INSUL DURABLE BEND UPTO 90DEG

## (undated) DEVICE — STAPLER SKIN SQ 30 ABSRB STPL DISP INSORB

## (undated) DEVICE — PRECISION THIN (9.0 X 0.38 X 31.0MM)

## (undated) DEVICE — 1810 FOAM BLOCK NEEDLE COUNTER: Brand: DEVON

## (undated) DEVICE — PIN BNE SM NON-THREADED BB-TAK